# Patient Record
Sex: FEMALE | Race: BLACK OR AFRICAN AMERICAN | Employment: FULL TIME | URBAN - METROPOLITAN AREA
[De-identification: names, ages, dates, MRNs, and addresses within clinical notes are randomized per-mention and may not be internally consistent; named-entity substitution may affect disease eponyms.]

---

## 2017-04-07 ENCOUNTER — OFFICE VISIT (OUTPATIENT)
Dept: FAMILY MEDICINE | Facility: CLINIC | Age: 62
End: 2017-04-07
Payer: COMMERCIAL

## 2017-04-07 VITALS
BODY MASS INDEX: 31.87 KG/M2 | RESPIRATION RATE: 12 BRPM | WEIGHT: 191.3 LBS | HEIGHT: 65 IN | DIASTOLIC BLOOD PRESSURE: 80 MMHG | TEMPERATURE: 98.2 F | HEART RATE: 78 BPM | SYSTOLIC BLOOD PRESSURE: 142 MMHG | OXYGEN SATURATION: 98 %

## 2017-04-07 DIAGNOSIS — R03.0 ELEVATED BLOOD PRESSURE READING WITHOUT DIAGNOSIS OF HYPERTENSION: ICD-10-CM

## 2017-04-07 DIAGNOSIS — S46.912D LEFT SHOULDER STRAIN, SUBSEQUENT ENCOUNTER: ICD-10-CM

## 2017-04-07 DIAGNOSIS — S46.811D STRAIN OF TRAPEZIUS MUSCLE, RIGHT, SUBSEQUENT ENCOUNTER: ICD-10-CM

## 2017-04-07 DIAGNOSIS — S16.1XXD CERVICAL STRAIN, SUBSEQUENT ENCOUNTER: Primary | ICD-10-CM

## 2017-04-07 PROCEDURE — 99213 OFFICE O/P EST LOW 20 MIN: CPT | Performed by: PHYSICIAN ASSISTANT

## 2017-04-07 RX ORDER — CYCLOBENZAPRINE HCL 10 MG
TABLET ORAL
COMMUNITY
Start: 2017-04-04 | End: 2017-05-18

## 2017-04-07 RX ORDER — METHOCARBAMOL 500 MG/1
1000 TABLET, FILM COATED ORAL 3 TIMES DAILY PRN
Qty: 90 TABLET | Refills: 0 | Status: SHIPPED | OUTPATIENT
Start: 2017-04-07 | End: 2017-05-18

## 2017-04-07 RX ORDER — HYDROCODONE BITARTRATE AND ACETAMINOPHEN 5; 325 MG/1; MG/1
TABLET ORAL
COMMUNITY
Start: 2017-04-04 | End: 2017-05-18

## 2017-04-07 NOTE — LETTER
[address]  [address]  [phone]    April 7, 2017      Niteshgarrett KAUR Alexandra New  [address] [facility] [address]              To Whom It MAy Concern,    Please excuse Jose from work until Monday 4/10/17.  She may return to work without restrictions.            Sincerely,    Tia Ledesma PA-C
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
## 2017-04-07 NOTE — PATIENT INSTRUCTIONS
Schedule physical therapy with [facility] ([phone]).  They have several locations around the [facility].    Try 2 tylenol up to every 4 hours as needed for pain.  Follow up with me in if no improvement in sxs   Return urgently if any change in symptoms like increasing pain, numbness, tingling or other change in symptoms.

## 2017-04-13 ENCOUNTER — THERAPY VISIT (OUTPATIENT)
Dept: PHYSICAL THERAPY | Facility: CLINIC | Age: 62
End: 2017-04-13
Payer: COMMERCIAL

## 2017-04-13 DIAGNOSIS — M54.2 NECK PAIN: Primary | ICD-10-CM

## 2017-04-13 PROCEDURE — 97110 THERAPEUTIC EXERCISES: CPT | Mod: GP | Performed by: PHYSICAL THERAPIST

## 2017-04-13 PROCEDURE — 97161 PT EVAL LOW COMPLEX 20 MIN: CPT | Mod: GP | Performed by: PHYSICAL THERAPIST
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
## 2017-04-13 NOTE — MR AVS SNAPSHOT
"              After Visit Summary   4/13/2017    Jose New    MRN: [id number]           Patient Information     Date Of Birth          1955        Visit Information        Provider Department      4/13/2017 5:00 PM Reggie Camargo, PT [facility]        Today's Diagnoses     Neck pain    -  1       Follow-ups after your visit        Your next 10 appointments already scheduled     Apr 19, 2017  4:00 PM CDT   [facility] Spine with Holly Dowling, PT   [facility] ([facility])    [address]  [facility] [id number] [phone]            Apr 24, 2017  6:00 PM CDT   GAEL Spine with Holly Dowling, PT   [facility] ([facility])    [address]  [facility] [id number] [phone]              Who to contact     If you have questions or need follow up information about today's clinic visit or your schedule please contact [facility] PHYSICAL THERAP[facility] directly at [phone].  Normal or non-critical lab and imaging results will be communicated to you by MyChart, letter or phone within 4 business days after the clinic has received the results. If you do not hear from us within 7 days, please contact the clinic through [employer] or phone. If you have a critical or abnormal lab result, we will notify you by phone as soon as possible.  Submit refill requests through [employer] or call your pharmacy and they will forward the refill request to us. Please allow 3 business days for your refill to be completed.          Additional Information About Your Visit        MyChart Information     [employer] lets you send messages to your doctor, view your test results, renew your prescriptions, schedule appointments and more. To sign up, go to [email or web address] . Click on \"Log in\" on the left side of the " "screen, which will take you to the Welcome page. Then click on \"Sign up Now\" on the right side of the page.     You will be asked to enter the access code listed below, as well as some personal information. Please follow the directions to create your username and password.     Your access code is: [email or web address]  Expires: 2017 11:14 AM     Your access code will  in 90 days. If you need help or a new code, please call your [place] clinic or [phone].        Care EveryWhere ID     This is your Care EveryWhere ID. This could be used by other organizations to access your [place] medical records  [id number]         Blood Pressure from Last 3 Encounters:   17 142/80   16 158/88   16 139/75    Weight from Last 3 Encounters:   17 86.8 kg (191 lb 4.8 oz)   16 87 kg (191 lb 14.4 oz)   16 87.6 kg (193 lb 1 oz)              We Performed the Following     HC PT EVAL, LOW COMPLEXITY     [facility] INITIAL EVAL REPORT     THERAPEUTIC EXERCISES        Primary Care Provider Office Phone # Fax #    Tia Ledesma PA-C [phone]       [address]  [facility] [address]        Thank you!     Thank you for [facility]  for your care. Our goal is always to provide you with excellent care. Hearing back from our patients is one way we can continue to improve our services. Please take a few minutes to complete the written survey that you may receive in the mail after your visit with us. Thank you!             Your Updated Medication List - Protect others around you: Learn how to safely use, store and throw away your medicines at www.disposemymeds.org.          This list is accurate as of: 17  6:16 PM.  Always use your most recent med list.                   Brand Name Dispense Instructions for use    cyclobenzaprine 10 MG tablet    FLEXERIL         HYDROcodone-acetaminophen 5-325 " MG per tablet    NORCO         methocarbamol 500 MG tablet    ROBAXIN    90 tablet    Take 2 tablets (1,000 mg) by mouth 3 times daily as needed for muscle spasms

## 2017-04-19 ENCOUNTER — THERAPY VISIT (OUTPATIENT)
Dept: PHYSICAL THERAPY | Facility: CLINIC | Age: 62
End: 2017-04-19
Payer: COMMERCIAL

## 2017-04-19 DIAGNOSIS — M54.2 NECK PAIN: ICD-10-CM

## 2017-04-19 PROCEDURE — 97110 THERAPEUTIC EXERCISES: CPT | Mod: GP | Performed by: PHYSICAL THERAPIST

## 2017-04-19 PROCEDURE — 97140 MANUAL THERAPY 1/> REGIONS: CPT | Mod: GP | Performed by: PHYSICAL THERAPIST

## 2017-04-19 NOTE — MR AVS SNAPSHOT
After Visit Summary   4/19/2017    Jose New    MRN: [id number]           Patient Information     Date Of Birth          1955        Visit Information        Provider Department      4/19/2017 4:00 PM Holly Dowling, PT [facility]        Today's Diagnoses     Neck pain           Follow-ups after your visit        Your next 10 appointments already scheduled     Apr 24, 2017  6:00 PM CDT   GAEL Spine with Holly Dowling PT   [facility] ([facility])    [address]  [facility] [address]   [phone]            May 01, 2017  4:00 PM CDT   GAEL Spine with Holly Dowling PT   [facility] ([facility])    [address]  [facility] [address]   [phone]            May 08, 2017  6:00 PM CDT   GAEL Spine with Holly Dowling PT   [facility] ([facility])    [address]  [facility] [address]   [phone]            May 15, 2017  5:20 PM CDT   GAEL Spine with Holly Dowling PT   [facility] ([facility])    [address]  [facility] [address]   [phone]              Who to contact     If you have questions or need follow up information about today's clinic visit or your schedule please contact [facility] PH[facility] directly at [phone].  Normal or non-critical lab and imaging results will be communicated to you by MyChart, letter or phone within 4 business days after the clinic has received the results. If you do not hear from us within 7 days, please contact the clinic through MyChart or phone. If you have a critical or abnormal lab result, we will notify you by phone as soon as possible.  Submit refill requests  "through Remember [employer] Member or call your pharmacy and they will forward the refill request to us. Please allow 3 business days for your refill to be completed.          Additional Information About Your Visit        Eyepichart Information     Remember The Member lets you send messages to your doctor, view your test results, renew your prescriptions, schedule appointments and more. To sign up, go to [email or web address] . Click on \"Log in\" on the left side of the screen, which will take you to the Welcome page. Then click on \"Sign up Now\" on the right side of the page.     You will be asked to enter the access code listed below, as well as some personal information. Please follow the directions to create your username and password.     Your access code is: [email or web address]  Expires: 2017 11:14 AM     Your access code will  in 90 days. If you need help or a new code, please call your [place] clinic or [phone].        Care EveryWhere ID     This is your Care EveryWhere ID. This could be used by other organizations to access your [place] medical records  [id number]         Blood Pressure from Last 3 Encounters:   17 142/80   16 158/88   16 139/75    Weight from Last 3 Encounters:   17 86.8 kg (191 lb 4.8 oz)   16 87 kg (191 lb 14.4 oz)   16 87.6 kg (193 lb 1 oz)              We Performed the Following     Manual Ther Tech, 1+Regions, EA 15 min     Therapeutic Exercises        Primary Care Provider Office Phone # Fax #    Tia Ledesma PA-C [phone]       [facility] [address] [place] [address] N  [facility] [address]        Thank you!     Thank you for choosing [facility]  for your care. Our goal is always to provide you with excellent care. Hearing back from our patients is one way we can continue to improve our services. Please take a few minutes to complete the written survey that you may receive in the mail " after your visit with us. Thank you!             Your Updated Medication List - Protect others around you: Learn how to safely use, store and throw away your medicines at www.disposemymeds.org.          This list is accurate as of: 4/19/17  5:05 PM.  Always use your most recent med list.                   Brand Name Dispense Instructions for use    cyclobenzaprine 10 MG tablet    FLEXERIL         HYDROcodone-acetaminophen 5-325 MG per tablet    NORCO         methocarbamol 500 MG tablet    ROBAXIN    90 tablet    Take 2 tablets (1,000 mg) by mouth 3 times daily as needed for muscle spasms

## 2017-04-19 NOTE — PROGRESS NOTES
Subjective:    HPI                    Objective:    System    Physical Exam    General     ROS    Assessment/Plan:      SUBJECTIVE  Subjective changes as noted by pt:  My neck, shoulders, upper and lower back pain is very bad- constant pain today and worse as day goes on at work. I have HA every day that comes and goes. I'm trying to do the exercises.   Current pain level: 10/10     Changes in function:  None     Adverse reaction to treatment or activity:  None    OBJECTIVE  Changes in objective findings:  None. Reviewed posture, cervical ROM exercises and scapular retraction with addition of cervical retraction today. Minimal ROM with cervical retraction, pain in neck /thoracic region during and after exercise- however no worse. Patient unable to move into supine position secondary to neck/ thoracic/LBP. Added STM to B UT's/cervical paraspinals in sitting- PL remained at 10/10 following exercises/MT.        ASSESSMENT  Daisie continues to require intervention to meet STG and LTG's: PT  Patient is becoming more independent in home exercise program    Progress made towards STG/LTG?  None    PLAN  Current treatment program is being advanced to more complex exercises.  The following procedures have been added:  posture, manual therapy and cervical retraction    PTA/ATC plan:  N/A    Please refer to the daily flowsheet for treatment today, total treatment time and time spent performing 1:1 timed codes.

## 2017-04-24 ENCOUNTER — THERAPY VISIT (OUTPATIENT)
Dept: PHYSICAL THERAPY | Facility: CLINIC | Age: 62
End: 2017-04-24
Payer: COMMERCIAL

## 2017-04-24 DIAGNOSIS — M54.2 NECK PAIN: ICD-10-CM

## 2017-04-24 PROCEDURE — 97140 MANUAL THERAPY 1/> REGIONS: CPT | Mod: GP | Performed by: PHYSICAL THERAPIST

## 2017-04-24 PROCEDURE — 97110 THERAPEUTIC EXERCISES: CPT | Mod: GP | Performed by: PHYSICAL THERAPIST

## 2017-04-24 NOTE — PROGRESS NOTES
Subjective:    HPI                    Objective:    System    Physical Exam    General     ROS    Assessment/Plan:      SUBJECTIVE  Subjective changes as noted by pt:  My neck,shld's and back pain is slightly better today. I'm doing the exercises and using ice every day. I usually get a HA about 2:00 on a work day- less on weekend.    Current pain level:  Current Pain level: 8/10   Changes in function:  None     Adverse reaction to treatment or activity:  None    OBJECTIVE  Changes in objective findings:  Mild increase in cervical retraction mobility with verbal/manual cues- increased neck pain during movement, no worse following slight increase ROM, verbal/manual cues needed to perform scapular retraction/depression in sitting, unable to transition from SL position to supine secondary to c/o increased neck/back pain. STM performed in sitting to UT's/paraspinals/rhomboids- PL 8/10 following treatment.       ASSESSMENT  Daisie continues to require intervention to meet STG and LTG's: PT  Patient is becoming more independent in home exercise program  Response to therapy has shown an improvement in  pain level  Progress made towards STG/LTG?  None    PLAN  Current treatment program is being advanced to more complex exercises.  The following procedures have been added:  AROM    PTA/ATC plan:  N/A    Please refer to the daily flowsheet for treatment today, total treatment time and time spent performing 1:1 timed codes.

## 2017-04-24 NOTE — MR AVS SNAPSHOT
After Visit Summary   4/24/2017    Jose New    MRN: [id number]           Patient Information     Date Of Birth          1955        Visit Information        Provider Department      4/24/2017 6:00 PM Holly Dowling, PT [facility]        Today's Diagnoses     Neck pain           Follow-ups after your visit        Your next 10 appointments already scheduled     May 01, 2017  4:00 PM CDT   GAEL Spine with Holly Dowling PT   [facility] ([facility])    [address]  [facility] [address]   [phone]            May 08, 2017  6:00 PM CDT   GAEL Spine with Holly Dowling PT   [facility] ([facility])    [address]  [facility] [address]   [phone]            May 15, 2017  5:20 PM CDT   GAEL Spine with Holly Dowling PT   [facility] ([facility])    [address]  [facility] [address]   [phone]              Who to contact     If you have questions or need follow up information about today's clinic visit or your schedule please contact [facility] PHYSICAL THERAPY directly at [phone].  Normal or non-critical lab and imaging results will be communicated to you by MyChart, letter or phone within 4 business days after the clinic has received the results. If you do not hear from us within 7 days, please contact the clinic through [employer] or phone. If you have a critical or abnormal lab result, we will notify you by phone as soon as possible.  Submit refill requests through [employer] or call your pharmacy and they will forward the refill request to us. Please allow 3 business days for your refill to be completed.          Additional Information About Your Visit        MyChart Information      "RESPACE lets you send messages to your doctor, view your test results, renew your prescriptions, schedule appointments and more. To sign up, go to [email or web address] . Click on \"Log in\" on the left side of the screen, which will take you to the Welcome page. Then click on \"Sign up Now\" on the right side of the page.     You will be asked to enter the access code listed below, as well as some personal information. Please follow the directions to create your username and password.     Your access code is: [email or web address]  Expires: 2017 11:14 AM     Your access code will  in 90 days. If you need help or a new code, please call your [place] clinic or [phone].        Care EveryWhere ID     This is your Care EveryWhere ID. This could be used by other organizations to access your [place] medical records  [id number]         Blood Pressure from Last 3 Encounters:   17 142/80   16 158/88   16 139/75    Weight from Last 3 Encounters:   17 86.8 kg (191 lb 4.8 oz)   16 87 kg (191 lb 14.4 oz)   16 87.6 kg (193 lb 1 oz)              We Performed the Following     Manual Ther Tech, 1+Regions, EA 15 min     Therapeutic Exercises        Primary Care Provider Office Phone # Fax #    Tia Ledesma PA-C [phone]       [address] [address]        Thank you!     Thank you for [facility] THER[facility]  for your care. Our goal is always to provide you with excellent care. Hearing back from our patients is one way we can continue to improve our services. Please take a few minutes to complete the written survey that you may receive in the mail after your visit with us. Thank you!             Your Updated Medication List - Protect others around you: Learn how to safely use, store and throw away your medicines at www.disposemymeds.org.          This list is accurate as of: " 4/24/17  6:52 PM.  Always use your most recent med list.                   Brand Name Dispense Instructions for use    cyclobenzaprine 10 MG tablet    FLEXERIL         HYDROcodone-acetaminophen 5-325 MG per tablet    NORCO         methocarbamol 500 MG tablet    ROBAXIN    90 tablet    Take 2 tablets (1,000 mg) by mouth 3 times daily as needed for muscle spasms

## 2017-05-01 ENCOUNTER — THERAPY VISIT (OUTPATIENT)
Dept: PHYSICAL THERAPY | Facility: CLINIC | Age: 62
End: 2017-05-01
Payer: COMMERCIAL

## 2017-05-01 DIAGNOSIS — M54.2 NECK PAIN: ICD-10-CM

## 2017-05-01 PROCEDURE — 97110 THERAPEUTIC EXERCISES: CPT | Mod: GP | Performed by: PHYSICAL THERAPIST

## 2017-05-01 PROCEDURE — 97140 MANUAL THERAPY 1/> REGIONS: CPT | Mod: GP | Performed by: PHYSICAL THERAPIST

## 2017-05-01 NOTE — PROGRESS NOTES
Subjective:    HPI                    Objective:    System    Physical Exam    General     ROS    Assessment/Plan:      SUBJECTIVE  Subjective changes as noted by pt:  I did not get a HA today and I have slightly less intense pain in the neck. My knees and back are still really sore and I maybe I should call my MD about it.      Current pain level:  Current Pain level: 7/10   Changes in function:  Yes, see flow sheet     Adverse reaction to treatment or activity:  None    OBJECTIVE  Changes in objective findings:  difficulty elevating B UE's- scaption 90 degrees + shld's/ neck in sitting or standing -added corner stretch FX or wall climb. Reviewed HEP- verbal/manual cues needed for proper form with cervical retraction and scapular retraction.         ASSESSMENT  Daisie continues to require intervention to meet STG and LTG's: PT  Patient is becoming more independent in home exercise program  Patient is not progressing as expected.  Response to therapy has shown an improvement in  pain level  Response to therapy has shown lack of progress in  ROM  and function  Progress made towards STG/LTG?  Yes (See Goal flowsheet attached for updates on achievement of STG and LTG)    PLAN  Current treatment program is being advanced to more complex exercises.  The following procedures have been added:  AROM for shld's    PTA/ATC plan:  N/A    Please refer to the daily flowsheet for treatment today, total treatment time and time spent performing 1:1 timed codes.

## 2017-05-01 NOTE — MR AVS SNAPSHOT
"              After Visit Summary   5/1/2017    Jose New    MRN: [id number]           Patient Information     Date Of Birth          1955        Visit Information        Provider Department      5/1/2017 4:00 PM Holly Dowling PT [facility]        Today's Diagnoses     Neck pain           Follow-ups after your visit        Your next 10 appointments already scheduled     May 08, 2017  6:00 PM CDT   GAEL Spine with Holly Dowling PT   [facility] ([facility])    [address]  [facility] [address] [phone]            May 15, 2017  5:20 PM CDT   GAEL Spine with Holly Dowling PT   [facility] ([facility])    [address]  [facility] [address] [phone]              Who to contact     If you have questions or need follow up information about today's clinic visit or your schedule please contact [facility] PHYSICAL THERAPY directly at [phone].  Normal or non-critical lab and imaging results will be communicated to you by MyChart, letter or phone within 4 business days after the clinic has received the results. If you do not hear from us within 7 days, please contact the clinic through Aeg[employer]hart or phone. If you have a critical or abnormal lab result, we will notify you by phone as soon as possible.  Submit refill requests through [employer] or call your pharmacy and they will forward the refill request to us. Please allow 3 business days for your refill to be completed.          Additional Information About Your Visit        MyChart Information     [employer]ex[employer] Systems lets you send messages to your doctor, view your test results, renew your prescriptions, schedule appointments and more. To sign up, go to [email or web address] . Click on \"Log in\" on the left side of the screen, " "which will take you to the Welcome page. Then click on \"Sign up Now\" on the right side of the page.     You will be asked to enter the access code listed below, as well as some personal information. Please follow the directions to create your username and password.     Your access code is: [email or web address]  Expires: 2017 11:14 AM     Your access code will  in 90 days. If you need help or a new code, please call your [facility] or [phone].        Care EveryWhere ID     This is your Care EveryWhere ID. This could be used by other organizations to access your [place] medical records  [id number]         Blood Pressure from Last 3 Encounters:   17 142/80   16 158/88   16 139/75    Weight from Last 3 Encounters:   17 86.8 kg (191 lb 4.8 oz)   16 87 kg (191 lb 14.4 oz)   16 87.6 kg (193 lb 1 oz)              We Performed the Following     Manual Ther Tech, 1+Regions, EA 15 min     Therapeutic Exercises        Primary Care Provider Office Phone # Fax #    Tia Ledesma PA-C [phone]       [address]  [facility] [address]        Thank you!     Thank you for choosing [facility] PH[facility] THERAP[facility]  for your care. Our goal is always to provide you with excellent care. Hearing back from our patients is one way we can continue to improve our services. Please take a few minutes to complete the written survey that you may receive in the mail after your visit with us. Thank you!             Your Updated Medication List - Protect others around you: Learn how to safely use, store and throw away your medicines at www.disposemymeds.org.          This list is accurate as of: 17  5:34 PM.  Always use your most recent med list.                   Brand Name Dispense Instructions for use    cyclobenzaprine 10 MG tablet    FLEXERIL         HYDROcodone-acetaminophen 5-325 MG per tablet    NORCO    "      methocarbamol 500 MG tablet    ROBAXIN    90 tablet    Take 2 tablets (1,000 mg) by mouth 3 times daily as needed for muscle spasms

## 2017-05-08 ENCOUNTER — THERAPY VISIT (OUTPATIENT)
Dept: PHYSICAL THERAPY | Facility: CLINIC | Age: 62
End: 2017-05-08
Payer: COMMERCIAL

## 2017-05-08 DIAGNOSIS — M54.2 NECK PAIN: ICD-10-CM

## 2017-05-08 PROCEDURE — 97112 NEUROMUSCULAR REEDUCATION: CPT | Mod: GP | Performed by: PHYSICAL THERAPIST

## 2017-05-08 PROCEDURE — 97140 MANUAL THERAPY 1/> REGIONS: CPT | Mod: GP | Performed by: PHYSICAL THERAPIST

## 2017-05-08 PROCEDURE — 97110 THERAPEUTIC EXERCISES: CPT | Mod: GP | Performed by: PHYSICAL THERAPIST

## 2017-05-08 NOTE — MR AVS SNAPSHOT
"              After Visit Summary   5/8/2017    Jose New    MRN: [id number]           Patient Information     Date Of Birth          1955        Visit Information        Provider Department      5/8/2017 6:00 PM Holly Dowling PT [facility]        Today's Diagnoses     Neck pain           Follow-ups after your visit        Your next 10 appointments already scheduled     May 15, 2017  5:20 PM CDT   GAEL Spine with Holly Dowling PT   [facility] ([facility])    [address]  [facility] [address] [phone]            May 22, 2017  6:00 PM CDT   GAEL Spine with Holly Dowling PT   [facility] ([facility])    [address]  [facility] [address] [phone]              Who to contact     If you have questions or need follow up information about today's clinic visit or your schedule please contact [facility] PHYSICAL THERAPY directly at [phone].  Normal or non-critical lab and imaging results will be communicated to you by MyChart, letter or phone within 4 business days after the clinic has received the results. If you do not hear from us within 7 days, please contact the clinic through Ium[employer] or phone. If you have a critical or abnormal lab result, we will notify you by phone as soon as possible.  Submit refill requests through [employer] or call your pharmacy and they will forward the refill request to us. Please allow 3 business days for your refill to be completed.          Additional Information About Your Visit        MyChart Information     [employer] Search lets you send messages to your doctor, view your test results, renew your prescriptions, schedule appointments and more. To sign up, go to [email or web address] . Click on \"Log in\" on the left side of the screen, " "which will take you to the Welcome page. Then click on \"Sign up Now\" on the right side of the page.     You will be asked to enter the access code listed below, as well as some personal information. Please follow the directions to create your username and password.     Your access code is: [email or web address]  Expires: 2017 11:14 AM     Your access code will  in 90 days. If you need help or a new code, please call your [facility] or [phone].        Care EveryWhere ID     This is your Care EveryWhere ID. This could be used by other organizations to access your [place] medical records  [id number]         Blood Pressure from Last 3 Encounters:   17 142/80   16 158/88   16 139/75    Weight from Last 3 Encounters:   17 86.8 kg (191 lb 4.8 oz)   16 87 kg (191 lb 14.4 oz)   16 87.6 kg (193 lb 1 oz)              We Performed the Following     Manual Ther Tech, 1+Regions, EA 15 min     Neuromuscular Re-Education     Therapeutic Exercises        Primary Care Provider Office Phone # Fax #    Tia Ledesma PA-C [phone]       [address]  [facility] [address]        Thank you!     Thank you for [facility] PHYSICAL THERAP[facility]  for your care. Our goal is always to provide you with excellent care. Hearing back from our patients is one way we can continue to improve our services. Please take a few minutes to complete the written survey that you may receive in the mail after your visit with us. Thank you!             Your Updated Medication List - Protect others around you: Learn how to safely use, store and throw away your medicines at www.disposemymeds.org.          This list is accurate as of: 17  7:23 PM.  Always use your most recent med list.                   Brand Name Dispense Instructions for use    cyclobenzaprine 10 MG tablet    FLEXERIL         HYDROcodone-acetaminophen " 5-325 MG per tablet    NORCO         methocarbamol 500 MG tablet    ROBAXIN    90 tablet    Take 2 tablets (1,000 mg) by mouth 3 times daily as needed for muscle spasms

## 2017-05-08 NOTE — PROGRESS NOTES
Subjective:    HPI                    Objective:    System    Physical Exam    General     ROS    Assessment/Plan:      SUBJECTIVE  Subjective changes as noted by pt:  My neck pain gets worse as day goes on, very sore driving home from work today. I got a HA about mid day today- on weekends I get less HA's. I'm trying to do the exercises several times a day and I think I'm getting better slowly. I think I'm reaching farther up the wall with my arms.      Current pain level: 7/10     Changes in function:  Yes (See Goal flowsheet attached for changes in current functional level)     Adverse reaction to treatment or activity:  None    OBJECTIVE  Changes in objective findings:  No change with shld AROM FLX/scaption- 90 degrees B with pain in shld's/neck. Slight increase in CROM rotation B and retraction mobility.  Patient unable to place towel around lower neck region for cervical EXT exercise due to B shld/neck pain. Reviewed wall climb and encouraged to use B UE's as able at home/work with reaching and light lifting.  Patient attempted to transition sit to supine and was again unable to secondary to neck/shld pain. Patient sits in forward head posture- discussed and reviewed the importance of frequent cervical/scapular retraction exercises to decrease the strain on neck/upper back. Trial of rowing with TB- verbal/manual cuing needed however minimal scapular/ UE movement observed.      ASSESSMENT  Daisie continues to require intervention to meet STG and LTG's: PT  Patient is becoming more independent in home exercise program  Patient is not progressing as expected.  Minimal change of sx's noted.  Response to therapy has shown an improvement in  pain level and ROM neck   Response to therapy has shown lack of progress in  muscle control, posture and function  Progress made towards STG/LTG?  Yes (See Goal flowsheet attached for updates on achievement of STG and LTG)    PLAN  Continue current treatment plan until patient  demonstrates readiness to progress to higher level exercises.  The following procedures have been added:  AROM, neuromuscular re-education and posture    PTA/ATC plan:  N/A    Please refer to the daily flowsheet for treatment today, total treatment time and time spent performing 1:1 timed codes.

## 2017-05-15 ENCOUNTER — THERAPY VISIT (OUTPATIENT)
Dept: PHYSICAL THERAPY | Facility: CLINIC | Age: 62
End: 2017-05-15
Payer: COMMERCIAL

## 2017-05-15 DIAGNOSIS — M54.2 NECK PAIN: ICD-10-CM

## 2017-05-15 PROCEDURE — 97140 MANUAL THERAPY 1/> REGIONS: CPT | Mod: GP | Performed by: PHYSICAL THERAPIST

## 2017-05-15 PROCEDURE — 97110 THERAPEUTIC EXERCISES: CPT | Mod: GP | Performed by: PHYSICAL THERAPIST

## 2017-05-15 PROCEDURE — 97112 NEUROMUSCULAR REEDUCATION: CPT | Mod: GP | Performed by: PHYSICAL THERAPIST

## 2017-05-15 NOTE — MR AVS SNAPSHOT
After Visit Summary   5/15/2017    Jose New    MRN: [id number]           Patient Information     Date Of Birth          1955        Visit Information        Provider Department      5/15/2017 5:20 PM Holly Dowling PT [facility]        Today's Diagnoses     Neck pain           Follow-ups after your visit        Your next 10 appointments already scheduled     May 18, 2017  4:40 PM CDT   Office Visit with Tia Ledesma PA-C   [facility]    [address] [place] [id number] [phone]           Bring a current list of meds and any records pertaining to this visit.  For Physicals, please bring immunization records and any forms needing to be filled out.  Please arrive 10 minutes early to complete paperwork.            May 22, 2017  6:00 PM CDT   GAEL Spine with Holly Dowling PT   [facility] ([facility])    [address]  [facility] [id number] [phone]              Who to contact     If you have questions or need follow up information about today's clinic visit or your schedule please contact [facility] directly at [phone].  Normal or non-critical lab and imaging results will be communicated to you by MyChart, letter or phone within 4 business days after the clinic has received the results. If you do not hear from us within 7 days, please contact the clinic through MyChart or phone. If you have a critical or abnormal lab result, we will notify you by phone as soon as possible.  Submit refill requests through [employer] or call your pharmacy and they will forward the refill request to us. Please allow 3 business days for your refill to be completed.          Additional Information About Your Visit        MyChart Information     [employer]  "lets you send messages to your doctor, view your test results, renew your prescriptions, schedule appointments and more. To sign up, go to [email or web address] . Click on \"Log in\" on the left side of the screen, which will take you to the Welcome page. Then click on \"Sign up Now\" on the right side of the page.     You will be asked to enter the access code listed below, as well as some personal information. Please follow the directions to create your username and password.     Your access code is: [email or web address]  Expires: 2017 11:14 AM     Your access code will  in 90 days. If you need help or a new code, please call your [facility] clinic or [phone].        Care EveryWhere ID     This is your Care EveryWhere ID. This could be used by other organizations to access your [facility] medical records  [id number]         Blood Pressure from Last 3 Encounters:   17 142/80   16 158/88   16 139/75    Weight from Last 3 Encounters:   17 86.8 kg (191 lb 4.8 oz)   16 87 kg (191 lb 14.4 oz)   16 87.6 kg (193 lb 1 oz)              We Performed the Following     GAEL Progress Notes Report     Manual Ther Tech, 1+Regions, EA 15 min     Neuromuscular Re-Education     Therapeutic Exercises        Primary Care Provider Office Phone # Fax #    Tia Ledesma PA-C [phone]       [address] [address]        Thank you!     Thank you for choosing [facility]  for your care. Our goal is always to provide you with excellent care. Hearing back from our patients is one way we can continue to improve our services. Please take a few minutes to complete the written survey that you may receive in the mail after your visit with us. Thank you!             Your Updated Medication List - Protect others around you: Learn how to safely use, store and throw away your medicines at " www.disposemymeds.org.          This list is accurate as of: 5/15/17  6:45 PM.  Always use your most recent med list.                   Brand Name Dispense Instructions for use    cyclobenzaprine 10 MG tablet    FLEXERIL         HYDROcodone-acetaminophen 5-325 MG per tablet    NORCO         methocarbamol 500 MG tablet    ROBAXIN    90 tablet    Take 2 tablets (1,000 mg) by mouth 3 times daily as needed for muscle spasms

## 2017-05-15 NOTE — PROGRESS NOTES
Subjective:    HPI                    Objective:    System    Physical Exam    General     ROS    Assessment/Plan:      PROGRESS  REPORT    Progress reporting period is from 4/13/17  to 5/15/17.       SUBJECTIVE  Subjective changes noted by patient:  I think that I'm improving slowly with regard to my neck motion and ability to raise my arms with reaching. I have less HA's overall but still get them about 3x a week. I still can not lay in bed without propping several pillows under my head.      Current pain level is  Current Pain level: 6.5/10     Previous pain level was  7/10  .   Changes in function:  Yes (See Goal flowsheet attached for changes in current functional level)  Adverse reaction to treatment or activity: None    OBJECTIVE  Changes noted in objective findings:  CROM: B rotation 40% pain neck , SB 40% B pain in neck, FLX 50% pain neck, EXT 30% pain in neck.  Forward head posture/rounded shoulders in sitting- verbal/manual cues needed to improve posture with discussion of the importance decreasing strain on neck/thoracic region.   B shld AROM: FLX 80 degrees with c/o shld and neck pain.  Patient unable to transition from sit to supine secondary to neck/shoulder pain.  Increased muscle tension/point tenderness to light touch B UT's, cervical paraspinals/SHREE's.     ASSESSMENT/PLAN  Updated problem list and treatment plan: Diagnosis 1:  Cervical thoracic strain  Pain -  hot/cold therapy, manual therapy, self management, education, directional preference exercise and home program  Decreased ROM/flexibility - manual therapy, therapeutic exercise and home program  Decreased strength - therapeutic exercise, therapeutic activities and home program  Inflammation - cold therapy and self management/home program  Impaired muscle performance - neuro re-education and home program  Decreased function - therapeutic activities and home program  Impaired posture - neuro re-education and home program  STG/LTGs have been  met or progress has been made towards goals:  Yes (See Goal flow sheet completed today.)  Assessment of Progress: The patient has had set backs in their progress.  Patient is meeting short term goals and is progressing towards long term goals.  Patient progress is slower than expected at this point in recovery.  Self Management Plans:  Patient has been instructed in a home treatment program.  Patient  has been instructed in self management of symptoms.  I have re-evaluated this patient and find that the nature, scope, duration and intensity of the therapy is appropriate for the medical condition of the patient.  Jose continues to require the following intervention to meet STG and LTG's:  PT    Recommendations:  This patient would benefit from further evaluation.    Please refer to the daily flowsheet for treatment today, total treatment time and time spent performing 1:1 timed codes.

## 2017-05-18 ENCOUNTER — OFFICE VISIT (OUTPATIENT)
Dept: FAMILY MEDICINE | Facility: CLINIC | Age: 62
End: 2017-05-18
Payer: COMMERCIAL

## 2017-05-18 ENCOUNTER — TELEPHONE (OUTPATIENT)
Dept: FAMILY MEDICINE | Facility: CLINIC | Age: 62
End: 2017-05-18

## 2017-05-18 VITALS
HEART RATE: 97 BPM | OXYGEN SATURATION: 100 % | DIASTOLIC BLOOD PRESSURE: 80 MMHG | TEMPERATURE: 98.1 F | SYSTOLIC BLOOD PRESSURE: 160 MMHG

## 2017-05-18 DIAGNOSIS — M25.561 ACUTE PAIN OF BOTH KNEES: Primary | ICD-10-CM

## 2017-05-18 DIAGNOSIS — S46.912D LEFT SHOULDER STRAIN, SUBSEQUENT ENCOUNTER: ICD-10-CM

## 2017-05-18 DIAGNOSIS — S16.1XXD CERVICAL STRAIN, SUBSEQUENT ENCOUNTER: ICD-10-CM

## 2017-05-18 DIAGNOSIS — S33.5XXD SPRAIN OF LUMBAR REGION, SUBSEQUENT ENCOUNTER: ICD-10-CM

## 2017-05-18 DIAGNOSIS — S46.811D STRAIN OF TRAPEZIUS MUSCLE, RIGHT, SUBSEQUENT ENCOUNTER: ICD-10-CM

## 2017-05-18 DIAGNOSIS — M25.562 ACUTE PAIN OF BOTH KNEES: Primary | ICD-10-CM

## 2017-05-18 PROCEDURE — 99213 OFFICE O/P EST LOW 20 MIN: CPT | Performed by: PHYSICIAN ASSISTANT

## 2017-05-18 RX ORDER — CYCLOBENZAPRINE HCL 10 MG
TABLET ORAL
Qty: 42 TABLET | Status: CANCELLED | OUTPATIENT
Start: 2017-05-18

## 2017-05-18 RX ORDER — METHOCARBAMOL 500 MG/1
1000 TABLET, FILM COATED ORAL 3 TIMES DAILY PRN
Qty: 120 TABLET | Refills: 0 | Status: SHIPPED | OUTPATIENT
Start: 2017-05-18 | End: 2017-07-12
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
## 2017-05-18 NOTE — PATIENT INSTRUCTIONS
Follow up with us for hypertension   Continue methocarbamol as needed for pain  Continue physical therapy  Follow up with us if no improvement over the next 2-3 weeks with physical therapy  Return urgently if any change in symptoms.
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
## 2017-05-22 ENCOUNTER — THERAPY VISIT (OUTPATIENT)
Dept: PHYSICAL THERAPY | Facility: CLINIC | Age: 62
End: 2017-05-22
Payer: COMMERCIAL

## 2017-05-22 DIAGNOSIS — M54.2 NECK PAIN: ICD-10-CM

## 2017-05-22 PROCEDURE — 97110 THERAPEUTIC EXERCISES: CPT | Mod: GP | Performed by: PHYSICAL THERAPIST

## 2017-05-22 PROCEDURE — 97140 MANUAL THERAPY 1/> REGIONS: CPT | Mod: GP | Performed by: PHYSICAL THERAPIST

## 2017-05-22 PROCEDURE — 97112 NEUROMUSCULAR REEDUCATION: CPT | Mod: GP | Performed by: PHYSICAL THERAPIST

## 2017-05-22 NOTE — PROGRESS NOTES
Subjective:    HPI                    Objective:    System    Physical Exam    General     ROS    Assessment/Plan:      SUBJECTIVE  Subjective changes as noted by pt:  I went in to my MD and she gave me PT order for my back and knees. My neck is about the same but I think I'm getting better overall. I did not have HA today.   Current pain level:  Current Pain level: 7/10   Changes in function:  Yes (See Goal flowsheet attached for changes in current functional level)     Adverse reaction to treatment or activity:  None    OBJECTIVE  Changes in objective findings:  Minimal change in active CROM all motions compared to NE on 5/15/17. Added pt OP on chin with cervical rotation in sitting and PT OP( observed increased rotation motion with PROM B) posture remains same- forward head/rounded shlds- constant verbal/manual cues needed to improve neutral sitting posture. B shld FLX AAROM with wand : 90 degrees. Less sensitivity to light tough B UT'/s paraspinals.         ASSESSMENT  Daisie continues to require intervention to meet STG and LTG's: PT  Patient is becoming more independent in home exercise program  Patient is not progressing as expected.  Response to therapy has shown an improvement in  pain level, HA frequency and muscle control  Response to therapy has shown lack of progress in  ROM , posture and function  Progress made towards STG/LTG?  Yes (See Goal flowsheet attached for updates on achievement of STG and LTG)    PLAN  Current treatment program is being advanced to more complex exercises.  The following procedures have been added:  PROM/AAROM, AROM and strengthening    PTA/ATC plan:  N/A    Please refer to the daily flowsheet for treatment today, total treatment time and time spent performing 1:1 timed codes.

## 2017-05-22 NOTE — MR AVS SNAPSHOT
"              After Visit Summary   5/22/2017    Jose New    MRN: [id number]           Patient Information     Date Of Birth          1955        Visit Information        Provider Department      5/22/2017 6:00 PM Holly Dowling PT [facility]        Today's Diagnoses     Neck pain           Follow-ups after your visit        Your next 10 appointments already scheduled     Jun 02, 2017  3:20 PM CDT   GAEL Spine with Holly Dowling PT   [facility] ([facility])    [address]  [facility] [id number] [phone]            Jun 05, 2017  3:20 PM CDT   GAEL Spine with Holly Dowling PT   [facility] ([facility])    [address]  [facility] [id number] [phone]              Who to contact     If you have questions or need follow up information about today's clinic visit or your schedule please contact [facility] directly at [phone].  Normal or non-critical lab and imaging results will be communicated to you by MyChart, letter or phone within 4 business days after the clinic has received the results. If you do not hear from us within 7 days, please contact the clinic through PrePayhart or phone. If you have a critical or abnormal lab result, we will notify you by phone as soon as possible.  Submit refill requests through Artify It or call your pharmacy and they will forward the refill request to us. Please allow 3 business days for your refill to be completed.          Additional Information About Your Visit        MyChart Information     Artify It lets you send messages to your doctor, view your test results, renew your prescriptions, schedule appointments and more. To sign up, go to [email or web address] . Click on \"Log in\" on the left side of the screen, " "which will take you to the Welcome page. Then click on \"Sign up Now\" on the right side of the page.     You will be asked to enter the access code listed below, as well as some personal information. Please follow the directions to create your username and password.     Your access code is: [email or web address]  Expires: 2017 11:14 AM     Your access code will  in 90 days. If you need help or a new code, please call your [facility] or [phone].        Care EveryWhere ID     This is your Care EveryWhere ID. This could be used by other organizations to access your [place] medical records  [id number]         Blood Pressure from Last 3 Encounters:   17 160/80   17 142/80   16 158/88    Weight from Last 3 Encounters:   17 86.8 kg (191 lb 4.8 oz)   16 87 kg (191 lb 14.4 oz)   16 87.6 kg (193 lb 1 oz)              We Performed the Following     Manual Ther Tech, 1+Regions, EA 15 min     Neuromuscular Re-Education     Therapeutic Exercises        Primary Care Provider Office Phone # Fax #    Tia Ledesma PA-C [phone]       [address] [address]        Thank you!     Thank you for [facility]  for your care. Our goal is always to provide you with excellent care. Hearing back from our patients is one way we can continue to improve our services. Please take a few minutes to complete the written survey that you may receive in the mail after your visit with us. Thank you!             Your Updated Medication List - Protect others around you: Learn how to safely use, store and throw away your medicines at www.disposemymeds.org.          This list is accurate as of: 17  7:19 PM.  Always use your most recent med list.                   Brand Name Dispense Instructions for use    methocarbamol 500 MG tablet    ROBAXIN    120 tablet    Take 2 tablets " (1,000 mg) by mouth 3 times daily as needed for muscle spasms

## 2017-06-01 ENCOUNTER — TELEPHONE (OUTPATIENT)
Dept: PEDIATRICS | Facility: CLINIC | Age: 62
End: 2017-06-01

## 2017-06-02 ENCOUNTER — THERAPY VISIT (OUTPATIENT)
Dept: PHYSICAL THERAPY | Facility: CLINIC | Age: 62
End: 2017-06-02
Payer: COMMERCIAL

## 2017-06-02 DIAGNOSIS — M25.562 BILATERAL KNEE PAIN: Primary | ICD-10-CM

## 2017-06-02 DIAGNOSIS — M54.50 BILATERAL LOW BACK PAIN WITHOUT SCIATICA: ICD-10-CM

## 2017-06-02 DIAGNOSIS — M25.561 BILATERAL KNEE PAIN: Primary | ICD-10-CM

## 2017-06-02 PROCEDURE — 97161 PT EVAL LOW COMPLEX 20 MIN: CPT | Mod: GP | Performed by: PHYSICAL THERAPIST

## 2017-06-02 PROCEDURE — 97110 THERAPEUTIC EXERCISES: CPT | Mod: GP | Performed by: PHYSICAL THERAPIST
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
## 2017-06-02 NOTE — MR AVS SNAPSHOT
After Visit Summary   6/2/2017    Jose New    MRN: [id number]           Patient Information     Date Of Birth          1955        Visit Information        Provider Department      6/2/2017 3:20 PM Holly Dowling, PT [facility]        Today's Diagnoses     Bilateral knee pain    -  1    Bilateral low back pain without sciatica           Follow-ups after your visit        Your next 10 appointments already scheduled     Jun 05, 2017  3:20 PM CDT   GAEL Spine with Holly Dowling PT   [facility] ([facility])    [address]  [facility] [address]   [phone]            Jun 14, 2017  3:20 PM CDT   GAEL Spine with Holly Dowling PT   [facility] ([facility])    [address]  [facility] [address]   [phone]            Jun 19, 2017  6:00 PM CDT   GAEL Spine with Holly Dowling PT   [facility] ([facility])    [address]  [facility] [address]   [phone]            Jun 26, 2017  5:20 PM CDT   GAEL Spine with Holly Dowling PT   [facility] ([facility])    [address]  [facility] [address]   [phone]              Who to contact     If you have questions or need follow up information about today's clinic visit or your schedule please contact [facility] directly at [phone].  Normal or non-critical lab and imaging results will be communicated to you by MyChart, letter or phone within 4 business days after the clinic has received the results. If you do not hear from us within 7 days, please contact the clinic through MyChart or phone. If you have a critical or abnormal lab result, we will notify you  "by phone as soon as possible.  Submit refill requests through Frugoton or call your pharmacy and they will forward the refill request to us. Please allow 3 business days for your refill to be completed.          Additional Information About Your Visit        [employer] Information     Frugoton lets you send messages to your doctor, view your test results, renew your prescriptions, schedule appointments and more. To sign up, go to [email or web address] . Click on \"Log in\" on the left side of the screen, which will take you to the Welcome page. Then click on \"Sign up Now\" on the right side of the page.     You will be asked to enter the access code listed below, as well as some personal information. Please follow the directions to create your username and password.     Your access code is: [email or web address]  Expires: 2017 11:14 AM     Your access code will  in 90 days. If you need help or a new code, please call your [place] clinic or [phone].        Care EveryWhere ID     This is your Care EveryWhere ID. This could be used by other organizations to access your [place] medical records  [id number]         Blood Pressure from Last 3 Encounters:   17 160/80   17 142/80   16 158/88    Weight from Last 3 Encounters:   17 86.8 kg (191 lb 4.8 oz)   16 87 kg (191 lb 14.4 oz)   16 87.6 kg (193 lb 1 oz)              We Performed the Following     GAEL Inital Eval Report     PT Eval, Low Complexity ([address])     Therapeutic Exercises        Primary Care Provider Office Phone # Fax #    Tia Ledesma PA-C [phone]       [facility] [address] [place] [address] N  [facility] [address]        Thank you!     Thank you for choosing [facility]ICAL THERAP[facility]  for your care. Our goal is always to provide you with excellent care. Hearing back from our patients is one way we can continue to improve our services. Please take a few " minutes to complete the written survey that you may receive in the mail after your visit with us. Thank you!             Your Updated Medication List - Protect others around you: Learn how to safely use, store and throw away your medicines at www.disposemymeds.org.          This list is accurate as of: 6/2/17  5:16 PM.  Always use your most recent med list.                   Brand Name Dispense Instructions for use    methocarbamol 500 MG tablet    ROBAXIN    120 tablet    Take 2 tablets (1,000 mg) by mouth 3 times daily as needed for muscle spasms
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
## 2017-06-03 ENCOUNTER — HEALTH MAINTENANCE LETTER (OUTPATIENT)
Age: 62
End: 2017-06-03

## 2017-06-05 ENCOUNTER — THERAPY VISIT (OUTPATIENT)
Dept: PHYSICAL THERAPY | Facility: CLINIC | Age: 62
End: 2017-06-05
Payer: COMMERCIAL

## 2017-06-05 DIAGNOSIS — M25.562 BILATERAL KNEE PAIN: ICD-10-CM

## 2017-06-05 DIAGNOSIS — M54.2 NECK PAIN: ICD-10-CM

## 2017-06-05 DIAGNOSIS — M54.50 BILATERAL LOW BACK PAIN WITHOUT SCIATICA: ICD-10-CM

## 2017-06-05 DIAGNOSIS — M25.561 BILATERAL KNEE PAIN: ICD-10-CM

## 2017-06-05 PROCEDURE — 97140 MANUAL THERAPY 1/> REGIONS: CPT | Mod: GP | Performed by: PHYSICAL THERAPIST

## 2017-06-05 PROCEDURE — 97110 THERAPEUTIC EXERCISES: CPT | Mod: GP | Performed by: PHYSICAL THERAPIST

## 2017-06-05 PROCEDURE — 97112 NEUROMUSCULAR REEDUCATION: CPT | Mod: GP | Performed by: PHYSICAL THERAPIST

## 2017-06-05 NOTE — PROGRESS NOTES
Subjective:    HPI                    Objective:    System    Physical Exam    General     ROS    Assessment/Plan:      SUBJECTIVE  Subjective changes as noted by pt:  My knees and back are feeling about the same as last Friday. My neck is still very stiff and I got a HA in the afternoon at work. I'm trying to do the HEP daily.     Current pain level:  Current Pain level: 6/10 (back, neck knees)   Changes in function:  Yes- see goal flow sheet     Adverse reaction to treatment or activity:  None    OBJECTIVE  Changes in objective findings:  Minimal change in shoulder AROM in standing : FLX  R 95 + shld/neck L 90 + shld/neck. Patient slowly transferred to supine position- added PROM cervical rotation( 70% B rotation however 40% AROM rotation in sitting position) Patient reported increased PL to 10/10 in neck/shld's following MT in supine.  Verbal /manual cues needed for proper technique with all exercises- see flow sheet.  Added roll in for adductors/ VMO and TKE B for quad strengthening.           ASSESSMENT  Daisie continues to require intervention to meet STG and LTG's: PT  Patient is becoming more independent in home exercise program  Patient is progressing slower than expected.     Response to therapy has shown  progress in  ROM neck, pain level neck/ HA   Progress made towards STG/LTG?  Yes (See Goal flowsheet attached for updates on achievement of STG and LTG)    PLAN  Current treatment program is being advanced to more complex exercises for knees. No advanced exercise for neck or back- patient is very challenged with current HEP and requires manual/verbal cues for all exercises.  The following procedures have been added:  strengthening, neuromuscular re-education and manual therapy    PTA/ATC plan:  N/A    Please refer to the daily flowsheet for treatment today, total treatment time and time spent performing 1:1 timed codes.

## 2017-06-05 NOTE — MR AVS SNAPSHOT
After Visit Summary   6/5/2017    Jose New    MRN: [id number]           Patient Information     Date Of Birth          1955        Visit Information        Provider Department      6/5/2017 3:20 PM Holly Dowling, PT [facility]        Today's Diagnoses     Bilateral knee pain        Bilateral low back pain without sciatica        Neck pain           Follow-ups after your visit        Your next 10 appointments already scheduled     Jun 14, 2017  3:20 PM CDT   GAEL Spine with Holly Dowling PT   [facility] ([facility])    [address]  [facility] [address]   [phone]            Jun 19, 2017  6:00 PM CDT   GAEL Spine with Holly Dowling PT   [facility] ([facility])    [address]  [facility] [address]   [phone]            Jun 26, 2017  5:20 PM CDT   GAEL Spine with Holly Dowling PT   [facility] ([facility])    [address]  [facility] [address]   [phone]              Who to contact     If you have questions or need follow up information about today's clinic visit or your schedule please contact [facility]ERAP[facility] directly at [phone].  Normal or non-critical lab and imaging results will be communicated to you by MyChart, letter or phone within 4 business days after the clinic has received the results. If you do not hear from us within 7 days, please contact the clinic through MyChart or phone. If you have a critical or abnormal lab result, we will notify you by phone as soon as possible.  Submit refill requests through [employer] or call your pharmacy and they will forward the refill request to us. Please allow 3 business days for your refill to be completed.           "Additional Information About Your Visit        MyChart Information     [employer] lets you send messages to your doctor, view your test results, renew your prescriptions, schedule appointments and more. To sign up, go to [email or web address] . Click on \"Log in\" on the left side of the screen, which will take you to the Welcome page. Then click on \"Sign up Now\" on the right side of the page.     You will be asked to enter the access code listed below, as well as some personal information. Please follow the directions to create your username and password.     Your access code is: [email or web address]  Expires: 2017 11:14 AM     Your access code will  in 90 days. If you need help or a new code, please call your [place] clinic or [phone].        Care EveryWhere ID     This is your Care EveryWhere ID. This could be used by other organizations to access your [place] medical records  [id number]         Blood Pressure from Last 3 Encounters:   17 160/80   17 142/80   16 158/88    Weight from Last 3 Encounters:   17 86.8 kg (191 lb 4.8 oz)   16 87 kg (191 lb 14.4 oz)   16 87.6 kg (193 lb 1 oz)              We Performed the Following     Manual Ther Tech, 1+Regions, EA 15 min     Neuromuscular Re-Education     Therapeutic Exercises        Primary Care Provider Office Phone # Fax #    Tia Ledesma PA-C [phone]       [address] [address]        Thank you!     Thank you for choosing [facility]  for your care. Our goal is always to provide you with excellent care. Hearing back from our patients is one way we can continue to improve our services. Please take a few minutes to complete the written survey that you may receive in the mail after your visit with us. Thank you!             Your Updated Medication List - Protect others around you: Learn how to safely " use, store and throw away your medicines at www.disposemymeds.org.          This list is accurate as of: 6/5/17  6:20 PM.  Always use your most recent med list.                   Brand Name Dispense Instructions for use    methocarbamol 500 MG tablet    ROBAXIN    120 tablet    Take 2 tablets (1,000 mg) by mouth 3 times daily as needed for muscle spasms

## 2017-06-14 ENCOUNTER — THERAPY VISIT (OUTPATIENT)
Dept: PHYSICAL THERAPY | Facility: CLINIC | Age: 62
End: 2017-06-14
Payer: COMMERCIAL

## 2017-06-14 DIAGNOSIS — M54.2 NECK PAIN: ICD-10-CM

## 2017-06-14 DIAGNOSIS — M54.50 BILATERAL LOW BACK PAIN WITHOUT SCIATICA: ICD-10-CM

## 2017-06-14 DIAGNOSIS — M25.561 BILATERAL KNEE PAIN: ICD-10-CM

## 2017-06-14 DIAGNOSIS — M25.562 BILATERAL KNEE PAIN: ICD-10-CM

## 2017-06-14 PROCEDURE — 97112 NEUROMUSCULAR REEDUCATION: CPT | Mod: GP | Performed by: PHYSICAL THERAPIST

## 2017-06-14 PROCEDURE — 97140 MANUAL THERAPY 1/> REGIONS: CPT | Mod: GP | Performed by: PHYSICAL THERAPIST

## 2017-06-14 PROCEDURE — 97530 THERAPEUTIC ACTIVITIES: CPT | Mod: GP | Performed by: PHYSICAL THERAPIST

## 2017-06-14 PROCEDURE — 97110 THERAPEUTIC EXERCISES: CPT | Mod: GP | Performed by: PHYSICAL THERAPIST

## 2017-06-14 NOTE — PROGRESS NOTES
Subjective:    HPI                    Objective:    System    Physical Exam    General     ROS    Assessment/Plan:      SUBJECTIVE  Subjective changes as noted by pt: I think I'm getting better. I'm doing the HEP. My neck was better at work today and I did not have a HA. My knees still hurt a lot with walking and stairs but less sharp pains. My back gets sore after sitting 10 min. at work but I can get up every 10' if I want which helps.        Current pain level:  Current Pain level: 6/10 neck (5/10 knees back)   Changes in function:  Yes (See Goal flowsheet attached for changes in current functional level)     Adverse reaction to treatment or activity:  None    OBJECTIVE  Changes in objective findings: minimal change in Shld AROM standing: FLX  B 95 + shld/neck   CROM: sitting- rotation 50% B + neck (pt guarded/ painful with cervical PROM supine rotation)  Reviewed HEP with verbal /manual cues needed for proper technique with exercises, addition of supported knee bends- tolerated fairly well. Instructed in neutral body mechanics with bending and lifting- verbal/manual cues needed to perform correctly.  Point tenderness/ muscle tension B UT's/ SHREE's- increased PL following MT.        ASSESSMENT  Daisie continues to require intervention to meet STG and LTG's: PT  Patient is becoming more independent in home exercise program  Patient is responding slower than expected.  Response to therapy has shown an improvement in  pain level, ROM neck , posture and HA  Response to therapy has shown lack of progress in  ROM and strength shoulders/UE and minimal change in function  Progress made towards STG/LTG?  Yes (See Goal flowsheet attached for updates on achievement of STG and LTG)    PLAN  Current treatment program is being advanced to more complex exercises.  The following procedures have been added:  strengthening, body mechanics and therapeutic activities    PTA/ATC plan:  N/A    Please refer to the daily flowsheet for  treatment today, total treatment time and time spent performing 1:1 timed codes.

## 2017-06-14 NOTE — MR AVS SNAPSHOT
After Visit Summary   6/14/2017    Jose New    MRN: [id number]           Patient Information     Date Of Birth          1955        Visit Information        Provider Department      6/14/2017 3:20 PM Holly Dowling, PT [facility]        Today's Diagnoses     Bilateral knee pain        Bilateral low back pain without sciatica        Neck pain           Follow-ups after your visit        Your next 10 appointments already scheduled     Jun 22, 2017  4:20 PM CDT   GAEL Spine with Reggie Camargo PT   [facility] ([facility])    [address]  [facility] [address]   [phone]            Jun 26, 2017  5:20 PM CDT   GAEL Spine with Holly Dowling PT   [facility] ([facility])    [address]  [facility] [address]   [phone]            Jul 03, 2017  5:20 PM CDT   GAEL Spine with Holly Dowling PT   [facility] ([facility])    [address]  [facility] [address]   [phone]              Who to contact     If you have questions or need follow up information about today's clinic visit or your schedule please contact [facility] PHYSICAL THERAPY directly at [phone].  Normal or non-critical lab and imaging results will be communicated to you by MyChart, letter or phone within 4 business days after the clinic has received the results. If you do not hear from us within 7 days, please contact the clinic through MyChart or phone. If you have a critical or abnormal lab result, we will notify you by phone as soon as possible.  Submit refill requests through [employer] or call your pharmacy and they will forward the refill request to us. Please allow 3 business days for your refill to be completed.  "         Additional Information About Your Visit        MyChart Information     [employer] lets you send messages to your doctor, view your test results, renew your prescriptions, schedule appointments and more. To sign up, go to [email or web address] . Click on \"Log in\" on the left side of the screen, which will take you to the Welcome page. Then click on \"Sign up Now\" on the right side of the page.     You will be asked to enter the access code listed below, as well as some personal information. Please follow the directions to create your username and password.     Your access code is: [email or web address]  Expires: 2017 11:14 AM     Your access code will  in 90 days. If you need help or a new code, please call your [place] clinic or [phone].        Care EveryWhere ID     This is your Care EveryWhere ID. This could be used by other organizations to access your [place] medical records  [id number]         Blood Pressure from Last 3 Encounters:   17 160/80   17 142/80   16 158/88    Weight from Last 3 Encounters:   17 86.8 kg (191 lb 4.8 oz)   16 87 kg (191 lb 14.4 oz)   16 87.6 kg (193 lb 1 oz)              We Performed the Following     Manual Ther Tech, 1+Regions, EA 15 min     Neuromuscular Re-Education     Therapeutic Activities     Therapeutic Exercises        Primary Care Provider Office Phone # Fax #    Tia Ledesma PA-C [phone]       [facility] [address] [place] [address]        Thank you!     Thank you for choosing [facility]  for your care. Our goal is always to provide you with excellent care. Hearing back from our patients is one way we can continue to improve our services. Please take a few minutes to complete the written survey that you may receive in the mail after your visit with us. Thank you!             Your Updated Medication List - Protect others " around you: Learn how to safely use, store and throw away your medicines at www.disposemymeds.org.          This list is accurate as of: 6/14/17  4:54 PM.  Always use your most recent med list.                   Brand Name Dispense Instructions for use    methocarbamol 500 MG tablet    ROBAXIN    120 tablet    Take 2 tablets (1,000 mg) by mouth 3 times daily as needed for muscle spasms

## 2017-06-22 ENCOUNTER — THERAPY VISIT (OUTPATIENT)
Dept: PHYSICAL THERAPY | Facility: CLINIC | Age: 62
End: 2017-06-22
Payer: COMMERCIAL

## 2017-06-22 DIAGNOSIS — M54.50 ACUTE BILATERAL LOW BACK PAIN WITHOUT SCIATICA: ICD-10-CM

## 2017-06-22 DIAGNOSIS — M54.2 NECK PAIN: ICD-10-CM

## 2017-06-22 DIAGNOSIS — M25.561 ACUTE PAIN OF BOTH KNEES: ICD-10-CM

## 2017-06-22 DIAGNOSIS — M25.562 ACUTE PAIN OF BOTH KNEES: ICD-10-CM

## 2017-06-22 PROCEDURE — 97110 THERAPEUTIC EXERCISES: CPT | Mod: GP | Performed by: PHYSICAL THERAPIST

## 2017-06-22 PROCEDURE — 97112 NEUROMUSCULAR REEDUCATION: CPT | Mod: GP | Performed by: PHYSICAL THERAPIST

## 2017-06-22 PROCEDURE — 97140 MANUAL THERAPY 1/> REGIONS: CPT | Mod: GP | Performed by: PHYSICAL THERAPIST

## 2017-06-22 NOTE — MR AVS SNAPSHOT
After Visit Summary   6/22/2017    Jose New    MRN: [id number]           Patient Information     Date Of Birth          1955        Visit Information        Provider Department      6/22/2017 5:00 PM Reggie Camargo, PT [facility]        Today's Diagnoses     Acute pain of both knees        Acute bilateral low back pain without sciatica        Neck pain           Follow-ups after your visit        Your next 10 appointments already scheduled     Jun 26, 2017  5:20 PM CDT   GAEL Spine with Holly Dowling, PT   [facility] ([facility])    [address]  [facility] [address] [phone]            Jul 03, 2017  5:20 PM CDT   GAEL Spine with Holly Dowling PT   [facility] ([facility])    [address]  [facility] [address] [phone]              Who to contact     If you have questions or need follow up information about today's clinic visit or your schedule please contact [facility] directly at [phone].  Normal or non-critical lab and imaging results will be communicated to you by MyChart, letter or phone within 4 business days after the clinic has received the results. If you do not hear from us within 7 days, please contact the clinic through [employer] or phone. If you have a critical or abnormal lab result, we will notify you by phone as soon as possible.  Submit refill requests through [employer] or call your pharmacy and they will forward the refill request to us. Please allow 3 business days for your refill to be completed.          Additional Information About Your Visit        [employer] Information     [employer] lets you send messages to your doctor, view your test results, renew your prescriptions, schedule appointments and more. To  "sign up, go to [email or web address] . Click on \"Log in\" on the left side of the screen, which will take you to the Welcome page. Then click on \"Sign up Now\" on the right side of the page.     You will be asked to enter the access code listed below, as well as some personal information. Please follow the directions to create your username and password.     Your access code is: [email or web address]  Expires: 2017 11:14 AM     Your access code will  in 90 days. If you need help or a new code, please call your [place] clinic or [phone].        Care EveryWhere ID     This is your Care EveryWhere ID. This could be used by other organizations to access your [place] medical records  [id number]         Blood Pressure from Last 3 Encounters:   17 160/80   17 142/80   16 158/88    Weight from Last 3 Encounters:   17 86.8 kg (191 lb 4.8 oz)   16 87 kg (191 lb 14.4 oz)   16 87.6 kg (193 lb 1 oz)              We Performed the Following     MANUAL THER TECH,1+REGIONS,EA 15 MIN     NEUROMUSCULAR RE-EDUCATION     THERAPEUTIC EXERCISES        Primary Care Provider Office Phone # Fax #    Tia Ledesma PA-C [phone]       [place] [address]        Equal Access to Services     OMAR BARCENAS : Hadii aad ku hadasho Soomaali, waaxda luqadaha, qaybta kaalmada adeegyada, paul angela. So [facility] [phone].    ATENCIÓN: Si john bran, tiene a pendleton disposición servicios gratuitos de asistencia lingüística. Albertina al [phone].    We comply with applicable federal civil rights laws and [place] laws. We do not discriminate on the basis of race, color, national origin, age, disability sex, sexual orientation or gender identity.            Thank you!     Thank you for choosing [facility]  for your care. Our goal is always to provide you with " excellent care. Hearing back from our patients is one way we can continue to improve our services. Please take a few minutes to complete the written survey that you may receive in the mail after your visit with us. Thank you!             Your Updated Medication List - Protect others around you: Learn how to safely use, store and throw away your medicines at www.disposemymeds.org.          This list is accurate as of: 6/22/17  5:42 PM.  Always use your most recent med list.                   Brand Name Dispense Instructions for use Diagnosis    methocarbamol 500 MG tablet    ROBAXIN    120 tablet    Take 2 tablets (1,000 mg) by mouth 3 times daily as needed for muscle spasms    Cervical strain, subsequent encounter, Left shoulder strain, subsequent encounter, Strain of trapezius muscle, right, subsequent encounter

## 2017-06-22 NOTE — PROGRESS NOTES
Subjective:    HPI                    Objective:    System              Cervical/Thoracic Evaluation    AROM:  AROM Cervical:    Flexion:            Min loss  Extension:       Major loss  Rotation:         Left: mod loss     Right: mod loss  Side Bend:      Left: mod loss     Right:  Mod loss                               Shoulder Evaluation:  ROM:  AROM:    Flexion:  Left:  85    Right:  85                                                                           General     ROS    Assessment/Plan:      SUBJECTIVE  Subjective changes as noted by pt:  Jose reports that since last session she had to go to [place] for a  and since coming back her neck is more sore.  Reports that she had to buy a neck collar to manage the pain in her neck while in the car.  Headache worse.  L shoulder sore today.  The R knee is more sore from car trip as well (medial knee pain).  Low back sore as well.       Current pain level: 7/10     Changes in function:  None     Adverse reaction to treatment or activity:  None    OBJECTIVE  Changes in objective findings:  Yes, See physical exam section and/or daily flowsheet for response to repeated movements.          ASSESSMENT  Jose continues to require intervention to meet STG and LTG's: PT  Patient has experienced an exacerbation of symptoms.  Response to therapy has shown a worsening of  pain level  Progress made towards STG/LTG?  None    PLAN  Continue current treatment plan until patient demonstrates readiness to progress to higher level exercises.    PTA/ATC plan:  N/A    Please refer to the daily flowsheet for treatment today, total treatment time and time spent performing 1:1 timed codes.

## 2017-06-26 ENCOUNTER — THERAPY VISIT (OUTPATIENT)
Dept: PHYSICAL THERAPY | Facility: CLINIC | Age: 62
End: 2017-06-26
Payer: COMMERCIAL

## 2017-06-26 DIAGNOSIS — M25.562 ACUTE PAIN OF BOTH KNEES: ICD-10-CM

## 2017-06-26 DIAGNOSIS — M54.50 ACUTE BILATERAL LOW BACK PAIN WITHOUT SCIATICA: ICD-10-CM

## 2017-06-26 DIAGNOSIS — M25.561 ACUTE PAIN OF BOTH KNEES: ICD-10-CM

## 2017-06-26 DIAGNOSIS — M54.2 NECK PAIN: ICD-10-CM

## 2017-06-26 PROCEDURE — 97530 THERAPEUTIC ACTIVITIES: CPT | Mod: GP | Performed by: PHYSICAL THERAPIST

## 2017-06-26 PROCEDURE — 97110 THERAPEUTIC EXERCISES: CPT | Mod: GP | Performed by: PHYSICAL THERAPIST

## 2017-06-26 PROCEDURE — 97112 NEUROMUSCULAR REEDUCATION: CPT | Mod: GP | Performed by: PHYSICAL THERAPIST

## 2017-06-26 PROCEDURE — 97140 MANUAL THERAPY 1/> REGIONS: CPT | Mod: GP | Performed by: PHYSICAL THERAPIST

## 2017-06-26 NOTE — MR AVS SNAPSHOT
"              After Visit Summary   6/26/2017    Jose New    MRN: [id number]           Patient Information     Date Of Birth          1955        Visit Information        Provider Department      6/26/2017 5:20 PM Holly Dowling PT [facility]        Today's Diagnoses     Acute pain of both knees        Acute bilateral low back pain without sciatica        Neck pain           Follow-ups after your visit        Your next 10 appointments already scheduled     Jul 03, 2017  5:20 PM CDT   GAEL Spine with Holly Dowling PT   [facility] ([facility])    [address]  [facility] [id number] [phone]              Who to contact     If you have questions or need follow up information about today's clinic visit or your schedule please contact [facility] directly at [phone].  Normal or non-critical lab and imaging results will be communicated to you by [employer], letter or phone within 4 business days after the clinic has received the results. If you do not hear from us within 7 days, please contact the clinic through [employer] or phone. If you have a critical or abnormal lab result, we will notify you by phone as soon as possible.  Submit refill requests through [employer] or call your pharmacy and they will forward the refill request to us. Please allow 3 business days for your refill to be completed.          Additional Information About Your Visit        [employer] Information     [employer] lets you send messages to your doctor, view your test results, renew your prescriptions, schedule appointments and more. To sign up, go to [email or web address] . Click on \"Log in\" on the left side of the screen, which will take you to the Welcome page. Then click on \"Sign up Now\" on the right side of the page.     You will be asked to enter the " access code listed below, as well as some personal information. Please follow the directions to create your username and password.     Your access code is: [email or web address]  Expires: 2017 11:14 AM     Your access code will  in 90 days. If you need help or a new code, please call your [place] clinic or [phone].        Care EveryWhere ID     This is your Care EveryWhere ID. This could be used by other organizations to access your [place] medical records  [id number]         Blood Pressure from Last 3 Encounters:   17 160/80   17 142/80   16 158/88    Weight from Last 3 Encounters:   17 86.8 kg (191 lb 4.8 oz)   16 87 kg (191 lb 14.4 oz)   16 87.6 kg (193 lb 1 oz)              We Performed the Following     Manual Ther Tech, 1+Regions, EA 15 min     Neuromuscular Re-Education     Therapeutic Activities     Therapeutic Exercises        Primary Care Provider Office Phone # Fax #    Tia Ledesma PA-C [phone]       [facility] [address]  [facility] [address]        Equal Access to Services     OMAR BARCENAS : Hadii madhu ku hadasho Soomaali, waaxda luqadaha, qaybta kaalmada adeegyada, paul angela. So [facility] [phone].    ATENCIÓN: Si habla español, tiene a pendleton disposición servicios gratuitos de asistencia lingüística. Llame al [phone].    We comply with applicable federal civil rights laws and [place] laws. We do not discriminate on the basis of race, color, national origin, age, disability sex, sexual orientation or gender identity.            Thank you!     Thank you for choosing [facility] PH[facility] THERAP[facility]  for your care. Our goal is always to provide you with excellent care. Hearing back from our patients is one way we can continue to improve our services. Please take a few minutes to complete the written survey that you may receive in the mail after your  visit with us. Thank you!             Your Updated Medication List - Protect others around you: Learn how to safely use, store and throw away your medicines at www.disposemymeds.org.          This list is accurate as of: 6/26/17  6:55 PM.  Always use your most recent med list.                   Brand Name Dispense Instructions for use Diagnosis    methocarbamol 500 MG tablet    ROBAXIN    120 tablet    Take 2 tablets (1,000 mg) by mouth 3 times daily as needed for muscle spasms    Cervical strain, subsequent encounter, Left shoulder strain, subsequent encounter, Strain of trapezius muscle, right, subsequent encounter

## 2017-06-26 NOTE — PROGRESS NOTES
"Subjective:    HPI                    Objective:    System    Physical Exam    General     ROS    Assessment/Plan:      SUBJECTIVE  Subjective changes as noted by pt:  My neck is still really painful and I've had a bad HA all day. My knees and LB are sore also. I have not done the HEP very much since I've been back from the trip.    Current pain level:  Current Pain level: 7/10 (LB 5/10, knees 5/10)   Changes in function:  No change in functional goals in the past 2 weeks, increase PL in neck, LB, knees.     Adverse reaction to treatment or activity:  None    OBJECTIVE  Changes in objective findings:  Trial of 6\" ascending step- 10 reps with c/o medial knee pain B and fatigue.Reviewed knee bends- tolerated 15 reps then c/o medial knee pain B.  R knee AROM FLX ( heel slide supine) 90, L 100 (medial knee pain B)   CROM in sitting mod. limitation B rotation,B SB, EXT with B neck pain. Supine PROM neck rotation 75% B. Added SOR in supine- decreased HA/neck pain( PL 7 to 5/10) following manual therapy.  Reviewed TA engagement/neutral posture in sitting, standing and bending.        ASSESSMENT  Daisie continues to require intervention to meet STG and LTG's: PT  Patient is not progressing as expected.  Patient has experienced an exacerbation of symptoms.  Response to therapy has shown a worsening of  pain level and function in past 2 weeks  Progress made towards STG/LTG?  Minimal to no change in functional goals in past 2 weeks, increased PL neck, knees and back.    PLAN  Continue current treatment plan until patient demonstrates readiness to progress to higher level exercises.  The following procedures have been added:  therapeutic activities  Patient to FU with MD next week, reassess and complete UT at next visit.  PTA/ATC plan:  N/A    Please refer to the daily flowsheet for treatment today, total treatment time and time spent performing 1:1 timed codes.              "

## 2017-07-03 ENCOUNTER — THERAPY VISIT (OUTPATIENT)
Dept: PHYSICAL THERAPY | Facility: CLINIC | Age: 62
End: 2017-07-03
Payer: COMMERCIAL

## 2017-07-03 DIAGNOSIS — M54.50 ACUTE BILATERAL LOW BACK PAIN WITHOUT SCIATICA: ICD-10-CM

## 2017-07-03 DIAGNOSIS — M25.561 ACUTE PAIN OF BOTH KNEES: ICD-10-CM

## 2017-07-03 DIAGNOSIS — M54.2 NECK PAIN: ICD-10-CM

## 2017-07-03 DIAGNOSIS — M25.562 ACUTE PAIN OF BOTH KNEES: ICD-10-CM

## 2017-07-03 PROCEDURE — 97140 MANUAL THERAPY 1/> REGIONS: CPT | Mod: GP | Performed by: PHYSICAL THERAPIST

## 2017-07-03 PROCEDURE — 97112 NEUROMUSCULAR REEDUCATION: CPT | Mod: GP | Performed by: PHYSICAL THERAPIST

## 2017-07-03 PROCEDURE — 97110 THERAPEUTIC EXERCISES: CPT | Mod: GP | Performed by: PHYSICAL THERAPIST

## 2017-07-03 PROCEDURE — 97530 THERAPEUTIC ACTIVITIES: CPT | Mod: GP | Performed by: PHYSICAL THERAPIST

## 2017-07-03 NOTE — MR AVS SNAPSHOT
After Visit Summary   7/3/2017    Jose New    MRN: [id number]           Patient Information     Date Of Birth          1955        Visit Information        Provider Department      7/3/2017 5:20 PM Holly Dowling PT [facility] Therapy        Today's Diagnoses     Acute pain of both knees        Acute bilateral low back pain without sciatica        Neck pain           Follow-ups after your visit        Your next 10 appointments already scheduled     Jul 10, 2017  4:40 PM CDT   [facility] Spine with Holly Dowling PT   [facility] ([facility])    [address]  [facility] [id number] [phone]            Jul 12, 2017  6:00 PM CDT   [facility]    [address] [place] [id number] [phone]           Bring a current list of meds and any records pertaining to this visit.  For Physicals, please bring immunization records and any forms needing to be filled out.  Please arrive 10 minutes early to complete paperwork.              Who to contact     If you have questions or need follow up information about today's clinic visit or your schedule please contact [facility] PHYSICAL THERAPY directly at [phone].  Normal or non-critical lab and imaging results will be communicated to you by MyChart, letter or phone within 4 business days after the clinic has received the results. If you do not hear from us within 7 days, please contact the clinic through MyChart or phone. If you have a critical or abnormal lab result, we will notify you by phone as soon as possible.  Submit refill requests through [employer] or call your pharmacy and they will forward the refill request to us. Please allow 3 business days for your refill to be completed.        "   Additional Information About Your Visit        MyChart Information     [employer] lets you send messages to your doctor, view your test results, renew your prescriptions, schedule appointments and more. To sign up, go to [email or web address] . Click on \"Log in\" on the left side of the screen, which will take you to the Welcome page. Then click on \"Sign up Now\" on the right side of the page.     You will be asked to enter the access code listed below, as well as some personal information. Please follow the directions to create your username and password.     Your access code is: [email or web address]  Expires: 2017 11:14 AM     Your access code will  in 90 days. If you need help or a new code, please call your [place] clinic or [phone].        Care EveryWhere ID     This is your Care EveryWhere ID. This could be used by other organizations to access your [place] medical records  [id number]         Blood Pressure from Last 3 Encounters:   17 160/80   17 142/80   16 158/88    Weight from Last 3 Encounters:   17 86.8 kg (191 lb 4.8 oz)   16 87 kg (191 lb 14.4 oz)   16 87.6 kg (193 lb 1 oz)              We Performed the Following     Manual Ther Tech, 1+Regions, EA 15 min     Neuromuscular Re-Education     Therapeutic Activities     Therapeutic Exercises        Primary Care Provider Office Phone # Fax #    Tia Ledesma PA-C [phone]       [address]  [facility] [address]        Equal Access to Services     VI BARCENAS : Hadii madhu vasquez Soliyah, waaxda luqadaha, qaybta kaalmada nasrin, paul angela. So [facility] [phone].    ATENCIÓN: Si habla español, tiene a pendleton disposición servicios gratuitos de asistencia lingüística. Llame al [phone].    We comply with applicable federal civil rights laws and [place] laws. We do not discriminate on the basis of race, color, national " origin, age, disability sex, sexual orientation or gender identity.            Thank you!     Thank you for choosing [facility] PH[facility]  for your care. Our goal is always to provide you with excellent care. Hearing back from our patients is one way we can continue to improve our services. Please take a few minutes to complete the written survey that you may receive in the mail after your visit with us. Thank you!             Your Updated Medication List - Protect others around you: Learn how to safely use, store and throw away your medicines at www.disposemymeds.org.          This list is accurate as of: 7/3/17  6:52 PM.  Always use your most recent med list.                   Brand Name Dispense Instructions for use Diagnosis    methocarbamol 500 MG tablet    ROBAXIN    120 tablet    Take 2 tablets (1,000 mg) by mouth 3 times daily as needed for muscle spasms    Cervical strain, subsequent encounter, Left shoulder strain, subsequent encounter, Strain of trapezius muscle, right, subsequent encounter

## 2017-07-03 NOTE — PROGRESS NOTES
"Subjective:    HPI                    Objective:    System    Physical Exam    General     ROS    Assessment/Plan:      SUBJECTIVE  Subjective changes as noted by pt:  My neck, back and knees are feeling better this week. I think I'm recovering from the car trip 2 weeks ago. No HA today. I'm doing more of the HEP this week also.     Current pain level:  Current Pain level: 5/10 (all)   Changes in function:  Yes (See Goal flowsheet attached for changes in current functional level)     Adverse reaction to treatment or activity:  None    OBJECTIVE  Changes in objective findings:  Yes, increased CROM rotation B in sitting- 70%, increased AROM shld FLX standing: R 100, L 95. Added wall push up.  6\" ascending step: 12 reps B- c/o medial knee pain.  Minimal to no change in neck PL following manual therapy in a reclined position.        ASSESSMENT  Daisie continues to require intervention to meet STG and LTG's: PT  Patient is responding slower than expected.  Response to therapy has shown an improvement in  pain level, ROM  and function- neck/HA  Response to therapy has shown lack of progress in function in knees LB  Progress made towards STG/LTG?  Yes (See Goal flowsheet attached for updates on achievement of STG and LTG)    PLAN  Current treatment program is being advanced to more complex exercises.  The following procedures have been added:  strengthening    PTA/ATC plan:  N/A    Please refer to the daily flowsheet for treatment today, total treatment time and time spent performing 1:1 timed codes.              "

## 2017-07-10 ENCOUNTER — THERAPY VISIT (OUTPATIENT)
Dept: PHYSICAL THERAPY | Facility: CLINIC | Age: 62
End: 2017-07-10
Payer: COMMERCIAL

## 2017-07-10 DIAGNOSIS — M25.561 ACUTE PAIN OF BOTH KNEES: ICD-10-CM

## 2017-07-10 DIAGNOSIS — M54.2 NECK PAIN: ICD-10-CM

## 2017-07-10 DIAGNOSIS — M25.562 ACUTE PAIN OF BOTH KNEES: ICD-10-CM

## 2017-07-10 DIAGNOSIS — M54.50 ACUTE BILATERAL LOW BACK PAIN WITHOUT SCIATICA: ICD-10-CM

## 2017-07-10 PROCEDURE — 97140 MANUAL THERAPY 1/> REGIONS: CPT | Mod: GP | Performed by: PHYSICAL THERAPIST

## 2017-07-10 PROCEDURE — 97110 THERAPEUTIC EXERCISES: CPT | Mod: GP | Performed by: PHYSICAL THERAPIST

## 2017-07-10 NOTE — PROGRESS NOTES
"Subjective:    HPI                    Objective:    System    Physical Exam    General     ROS    Assessment/Plan:      PROGRESS  REPORT    Progress reporting period is from 5/15/17 to 7/10/17.       SUBJECTIVE  Subjective changes noted by patient: I have no HA today but it fluctuates and I get one about 3x week. My neck and back are feeling fairly good today, but this weekend I was in a lot of pain everywhere in my body. My knees were sore when sitting at work today after 1 hour- it helps a lot getting up often at work. I'm taking the pain medications in the evening since they make me drowsy in daytime. I'm doing some of my home exercises each day.    Current pain level is 5/10 back, neck ,knees      Previous pain level was  5/10  .   Changes in function:  Yes (See Goal flowsheet attached for changes in current functional level)  Adverse reaction to treatment or activity: None  Response to therapy has shown improvement in pain, ROM and function in neck/HA sx's.  Response to therapy has shown lack of progress in function of knees and back and UE ROM.  OBJECTIVE  Changes noted in objective findings: increased CROM sitting : rotation R 60% L 60% SB  R 50% L 50%  FLX 60%  EXT 40%, point tenderness B UT's, SHREE's with inability to lay supine for manual therapy treatment secondary to c/o neck/ back pain.    Minimal change in shoulder AROM standing: FLX R 100 L 100- c/o shld/neck pain     Minimal change in LROM: FLX 70% + LBP EXT 60% +,  fair sitting posture with verbal cues for head position and TA recruitment to avoid slouch posture.    Minimal change in Knee AROM sitting : FLX R 90+   L 90+    PROM: FLX R 100+ L 102+, EXT 2 B  MMT knee EXT 4+/5 B with pain  6\" ascending step increases medial knee pain B at 10 reps    ASSESSMENT/PLAN  Updated problem list and treatment plan: Diagnosis 1:  Neck pain/strain  Pain -  hot/cold therapy, manual therapy, self management, education, directional preference exercise and home " program  Decreased ROM/flexibility - manual therapy, therapeutic exercise and home program  Decreased joint mobility - manual therapy, therapeutic exercise and home program  Impaired muscle performance - neuro re-education and home program  Decreased function - therapeutic activities and home program  Impaired posture - neuro re-education and home program  Diagnosis 2:  LBP/lumbar strain   Pain -  hot/cold therapy, self management, education and home program  Decreased ROM/flexibility - therapeutic exercise and home program  Impaired muscle performance - neuro re-education and home program  Decreased function - therapeutic activities and home program  Impaired posture - neuro re-education and home program  Diagnosis 3:  B knee pain  Pain -  hot/cold therapy, self management, education and home program  Decreased ROM/flexibility - therapeutic exercise and home program  Decreased joint mobility - therapeutic exercise and home program  Decreased strength - therapeutic exercise, therapeutic activities and home program  Impaired gait - home program  Impaired muscle performance - neuro re-education and home program  Decreased function - therapeutic activities and home program   STG/LTGs have been met or progress has been made towards goals:  Yes (See Goal flow sheet completed today.)  Assessment of Progress: The patient has had set backs in their progress.  Patient has shown improvement in regard to some the STG's related to neck, back and knees however progress has been very slow.  Self Management Plans:  Patient has been instructed in a home treatment program.  Patient  has been instructed in self management of symptoms.  I have re-evaluated this patient and find that the nature, scope, duration and intensity of the therapy is appropriate for the medical condition of the patient.  Jose continues to require the following intervention to meet STG and LTG's:  PT    Recommendations:  This patient would benefit from  further evaluation. MD appointment scheduled on 7/12/17 please advise regarding other treatment options for patient or continued PT.    Please refer to the daily flowsheet for treatment today, total treatment time and time spent performing 1:1 timed codes.

## 2017-07-10 NOTE — MR AVS SNAPSHOT
After Visit Summary   7/10/2017    Jose eNw    MRN: [id number]           Patient Information     Date Of Birth          1955        Visit Information        Provider Department      7/10/2017 4:40 PM Holly Dowling PT [facility] Physical Therapy        Today's Diagnoses     Acute pain of both knees        Acute bilateral low back pain without sciatica        Neck pain           Follow-ups after your visit        Your next 10 appointments already scheduled     Jul 12, 2017  6:00 PM CDT   Office Visit [facility]   [facility]    [address] [place] [id number] [phone]           Bring a current list of meds and any records pertaining to this visit.  For Physicals, please bring immunization records and any forms needing to be filled out.  Please arrive 10 minutes early to complete paperwork.              Who to contact     If you have questions or need follow up information about today's clinic visit or your schedule please contact [facility] PHYSICAL THERAPY directly at [phone].  Normal or non-critical lab and imaging results will be communicated to you by [employer], letter or phone within 4 business days after the clinic has received the results. If you do not hear from us within 7 days, please contact the clinic through [employer] or phone. If you have a critical or abnormal lab result, we will notify you by phone as soon as possible.  Submit refill requests through [employer] or call your pharmacy and they will forward the refill request to us. Please allow 3 business days for your refill to be completed.          Additional Information About Your Visit        MyChart Information     [employer] lets you send messages to your doctor, view your test results, renew your prescriptions, schedule appointments and more. To sign up, go to  [email or web address] . Click on \"Log in\" on the left side of the screen, which will take you to the Welcome page. Then click on \"Sign up Now\" on the right side of the page.     You will be asked to enter the access code listed below, as well as some personal information. Please follow the directions to create your username and password.     Your access code is: [email or web address]  Expires: 10/8/2017  7:44 PM     Your access code will  in 90 days. If you need help or a new code, please call your [place] clinic or [phone].        Care EveryWhere ID     This is your Care EveryWhere ID. This could be used by other organizations to access your [place] medical records  [id number]         Blood Pressure from Last 3 Encounters:   17 160/80   17 142/80   16 158/88    Weight from Last 3 Encounters:   17 86.8 kg (191 lb 4.8 oz)   16 87 kg (191 lb 14.4 oz)   16 87.6 kg (193 lb 1 oz)              We Performed the Following     GAEL Progress Notes Report     Manual Ther Tech, 1+Regions, EA 15 min     Therapeutic Exercises        Primary Care Provider Office Phone # Fax #    Tia Ledesma PA-C [phone]       [facility] [address] [place] [address]        Equal Access to Services     OMAR BARCENAS AH: Hadii madhu riverao Soliyah, waaxda luqadaha, qaybta kaalmada adeegyada, paul angela. So [facility] [phone].    ATENCIÓN: Si sofiala shant, tiene a pendleton disposición servicios gratuitos de asistencia lingüística. Albertina al [phone].    We comply with applicable federal civil rights laws and [place] laws. We do not discriminate on the basis of race, color, national origin, age, disability sex, sexual orientation or gender identity.            Thank you!     Thank you for choosing [facility] PHYSICAL THERAPY  for your care. Our goal is always to provide you with excellent " care. Hearing back from our patients is one way we can continue to improve our services. Please take a few minutes to complete the written survey that you may receive in the mail after your visit with us. Thank you!             Your Updated Medication List - Protect others around you: Learn how to safely use, store and throw away your medicines at www.disposemymeds.org.          This list is accurate as of: 7/10/17  7:44 PM.  Always use your most recent med list.                   Brand Name Dispense Instructions for use Diagnosis    methocarbamol 500 MG tablet    ROBAXIN    120 tablet    Take 2 tablets (1,000 mg) by mouth 3 times daily as needed for muscle spasms    Cervical strain, subsequent encounter, Left shoulder strain, subsequent encounter, Strain of trapezius muscle, right, subsequent encounter

## 2017-07-12 ENCOUNTER — OFFICE VISIT (OUTPATIENT)
Dept: FAMILY MEDICINE | Facility: CLINIC | Age: 62
End: 2017-07-12
Payer: COMMERCIAL

## 2017-07-12 VITALS
SYSTOLIC BLOOD PRESSURE: 136 MMHG | HEART RATE: 80 BPM | OXYGEN SATURATION: 100 % | DIASTOLIC BLOOD PRESSURE: 86 MMHG | TEMPERATURE: 98.1 F | RESPIRATION RATE: 18 BRPM

## 2017-07-12 DIAGNOSIS — S16.1XXD CERVICAL STRAIN, SUBSEQUENT ENCOUNTER: ICD-10-CM

## 2017-07-12 DIAGNOSIS — S33.5XXS SPRAIN OF LUMBAR REGION, SEQUELA: ICD-10-CM

## 2017-07-12 DIAGNOSIS — S46.912D LEFT SHOULDER STRAIN, SUBSEQUENT ENCOUNTER: ICD-10-CM

## 2017-07-12 DIAGNOSIS — M25.562 ACUTE PAIN OF BOTH KNEES: Primary | ICD-10-CM

## 2017-07-12 DIAGNOSIS — M25.561 ACUTE PAIN OF BOTH KNEES: Primary | ICD-10-CM

## 2017-07-12 DIAGNOSIS — S46.811D STRAIN OF TRAPEZIUS MUSCLE, RIGHT, SUBSEQUENT ENCOUNTER: ICD-10-CM

## 2017-07-12 PROCEDURE — 99213 OFFICE O/P EST LOW 20 MIN: CPT | Performed by: PHYSICIAN ASSISTANT

## 2017-07-12 RX ORDER — METHOCARBAMOL 500 MG/1
1000 TABLET, FILM COATED ORAL 3 TIMES DAILY PRN
Qty: 120 TABLET | Refills: 0 | Status: SHIPPED | OUTPATIENT
Start: 2017-07-12 | End: 2017-09-01

## 2017-07-12 ASSESSMENT — PAIN SCALES - GENERAL: PAINLEVEL: SEVERE PAIN (6)
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
## 2017-07-12 NOTE — PATIENT INSTRUCTIONS
Continue with robaxin in evening as needed for pain.   Return urgently if any change in symptoms.    Work on diet and weight loss.   Continue with physical therapy
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
## 2017-07-19 ENCOUNTER — THERAPY VISIT (OUTPATIENT)
Dept: PHYSICAL THERAPY | Facility: CLINIC | Age: 62
End: 2017-07-19
Payer: COMMERCIAL

## 2017-07-19 DIAGNOSIS — M54.2 NECK PAIN: ICD-10-CM

## 2017-07-19 DIAGNOSIS — M54.50 ACUTE BILATERAL LOW BACK PAIN WITHOUT SCIATICA: ICD-10-CM

## 2017-07-19 DIAGNOSIS — M25.562 ACUTE PAIN OF BOTH KNEES: ICD-10-CM

## 2017-07-19 DIAGNOSIS — M25.561 ACUTE PAIN OF BOTH KNEES: ICD-10-CM

## 2017-07-19 PROCEDURE — 97140 MANUAL THERAPY 1/> REGIONS: CPT | Mod: GP | Performed by: PHYSICAL THERAPIST

## 2017-07-19 PROCEDURE — 97530 THERAPEUTIC ACTIVITIES: CPT | Mod: GP | Performed by: PHYSICAL THERAPIST

## 2017-07-19 PROCEDURE — 97110 THERAPEUTIC EXERCISES: CPT | Mod: GP | Performed by: PHYSICAL THERAPIST
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
## 2017-07-24 ENCOUNTER — TELEPHONE (OUTPATIENT)
Dept: FAMILY MEDICINE | Facility: CLINIC | Age: 62
End: 2017-07-24

## 2017-07-24 NOTE — LETTER
[facility]  [address] [place]  [address]  [phone]      July 24, 2017      Jose New  [address] [place] [id number]            Dear Jose ,    Our records indicate you are due for the following screenings:    Physical with pap smear  Mammogram  Colonoscopy    It is important to keep up on all preventative cancer screenings.  Please call to schedule an appointment at [phone].    If you have already completed these screenings outside the [facility] system please contact us so we can obtain the records and update your chart.    We look forward to seeing you soon.      Sincerely,    [facility]  Quality Care Team

## 2017-07-24 NOTE — TELEPHONE ENCOUNTER
Panel Management Review      7/12/2017    Fail List measure: PAP, MAMMO, COLON      Patient is due/failing the following:   COLONOSCOPY, MAMMOGRAM, PAP and PHYSICAL    Action needed:   Patient needs office visit for above.    Type of outreach:    Sent letter.    Questions for provider review:    None                                                                                                                                    Rica Alaniz

## 2017-07-28 ENCOUNTER — THERAPY VISIT (OUTPATIENT)
Dept: PHYSICAL THERAPY | Facility: CLINIC | Age: 62
End: 2017-07-28
Payer: COMMERCIAL

## 2017-07-28 DIAGNOSIS — M25.562 ACUTE PAIN OF BOTH KNEES: ICD-10-CM

## 2017-07-28 DIAGNOSIS — M25.561 ACUTE PAIN OF BOTH KNEES: ICD-10-CM

## 2017-07-28 DIAGNOSIS — M54.50 ACUTE BILATERAL LOW BACK PAIN WITHOUT SCIATICA: ICD-10-CM

## 2017-07-28 DIAGNOSIS — M54.2 NECK PAIN: ICD-10-CM

## 2017-07-28 PROCEDURE — 97110 THERAPEUTIC EXERCISES: CPT | Mod: GP | Performed by: PHYSICAL THERAPIST

## 2017-07-28 PROCEDURE — 97140 MANUAL THERAPY 1/> REGIONS: CPT | Mod: GP | Performed by: PHYSICAL THERAPIST

## 2017-07-28 NOTE — PROGRESS NOTES
"Subjective:    HPI                    Objective:    System    Physical Exam    General     ROS    Assessment/Plan:      SUBJECTIVE  Subjective changes as noted by pt:  I'm doing the HEP ok- grouped the exercises togethe so not overdoing with them. I notice the HA more when driving to work- then it lessens as day goes on. I'm having more shooting pains in the R medial knee today. Neck and back is feeling the same as last session PT.    Current pain level: 5/10 neck and back, R knee 6/10   Changes in function:  Yes (See Goal flowsheet attached for changes in current functional level)     Adverse reaction to treatment or activity:  None    OBJECTIVE  Changes in objective findings: minimal to no change in neck, back, knees since 7/19/17- slouched sitting posture with forward head position/rounded shoulders- improves with verbal cues. shld AROM:  B pain neck/shld's standing.   Added wall sit(10\" hold- increased R knee pain, back and neck pain) for quad strengthening since knee bends were increasing back pain.   Muscle tension/point tenderness B UT's/levator- increased PL 7/10 in neck, back and knees following manual treatment/ exercises.          ASSESSMENT  Daisie continues to require intervention to meet STG and LTG's: PT  No change of symptoms has been noted since 7/19/17  Patient is not progressing as expected.  Response to therapy has shown an improvement in  ROM neck , muscle control and posture  Response to therapy has shown lack of progress in  pain level and function knees or back  Progress made towards STG/LTG?  Yes (See Goal flowsheet attached for updates on achievement of STG and LTG)    PLAN  Current treatment program is being advanced to more complex exercises.  The following procedures have been added:  strengthening    PTA/ATC plan:  N/A    Please refer to the daily flowsheet for treatment today, total treatment time and time spent performing 1:1 timed codes.              "

## 2017-07-28 NOTE — MR AVS SNAPSHOT
"              After Visit Summary   7/28/2017    Jose New    MRN: [id number]           Patient Information     Date Of Birth          1955        Visit Information        Provider Department      7/28/2017 2:40 PM Holly Dowling PT [facility] Physical Therapy        Today's Diagnoses     Acute pain of both knees        Acute bilateral low back pain without sciatica        Neck pain           Follow-ups after your visit        Your next 10 appointments already scheduled     Aug 11, 2017  2:40 PM CDT   [facility] Spine with Holly Dowling PT   [facility] (Health system)    [address]  [facility] [id number] [phone]              Who to contact     If you have questions or need follow up information about today's clinic visit or your schedule please contact [facility] directly at [phone].  Normal or non-critical lab and imaging results will be communicated to you by leemailhart, letter or phone within 4 business days after the clinic has received the results. If you do not hear from us within 7 days, please contact the clinic through leemailhart or phone. If you have a critical or abnormal lab result, we will notify you by phone as soon as possible.  Submit refill requests through [employer] or call your pharmacy and they will forward the refill request to us. Please allow 3 business days for your refill to be completed.          Additional Information About Your Visit        [employer]hart Information     [employer] lets you send messages to your doctor, view your test results, renew your prescriptions, schedule appointments and more. To sign up, go to [email or web address] . Click on \"Log in\" on the left side of the screen, which will take you to the Welcome page. Then click on \"Sign up Now\" on the right side of the page.     You will be asked to enter the " access code listed below, as well as some personal information. Please follow the directions to create your username and password.     Your access code is: [email or web address]  Expires: 10/8/2017  7:44 PM     Your access code will  in 90 days. If you need help or a new code, please call your [facility] or [phone].        Care EveryWhere ID     This is your Care EveryWhere ID. This could be used by other organizations to access your [place] medical records  [id number]         Blood Pressure from Last 3 Encounters:   17 136/86   17 160/80   17 142/80    Weight from Last 3 Encounters:   17 86.8 kg (191 lb 4.8 oz)   16 87 kg (191 lb 14.4 oz)   16 87.6 kg (193 lb 1 oz)              We Performed the Following     Manual Ther Tech, 1+Regions, EA 15 min     Therapeutic Exercises        Primary Care Provider Office Phone # Fax #    Tia Ledesma PA-C [phone]       [facility] [address] [place] [address] [address]  [facility] [address]        Equal Access to Services     OMAR BARCENAS : Hadii aad ku hadasho Soomaali, waaxda luqadaha, qaybta kaalmada adeegyada, waxay hectorin haynolvian roberto angela. So [facility] [phone].    ATENCIÓN: Si habla español, tiene a pendleton disposición servicios gratuitos de asistencia lingüística. [facility] [phone].    We comply with applicable federal civil rights laws and [place] laws. We do not discriminate on the basis of race, color, national origin, age, disability sex, sexual orientation or gender identity.            Thank you!     Thank you for choosing [facility] PHYSICAL THERAP[facility]  for your care. Our goal is always to provide you with excellent care. Hearing back from our patients is one way we can continue to improve our services. Please take a few minutes to complete the written survey that you may receive in the mail after your visit with us. Thank you!             Your Updated  Medication List - Protect others around you: Learn how to safely use, store and throw away your medicines at www.disposemymeds.org.          This list is accurate as of: 7/28/17  4:32 PM.  Always use your most recent med list.                   Brand Name Dispense Instructions for use Diagnosis    methocarbamol 500 MG tablet    ROBAXIN    120 tablet    Take 2 tablets (1,000 mg) by mouth 3 times daily as needed for muscle spasms    Cervical strain, subsequent encounter, Left shoulder strain, subsequent encounter, Strain of trapezius muscle, right, subsequent encounter

## 2017-08-11 ENCOUNTER — THERAPY VISIT (OUTPATIENT)
Dept: PHYSICAL THERAPY | Facility: CLINIC | Age: 62
End: 2017-08-11
Payer: COMMERCIAL

## 2017-08-11 DIAGNOSIS — M54.50 ACUTE BILATERAL LOW BACK PAIN WITHOUT SCIATICA: ICD-10-CM

## 2017-08-11 DIAGNOSIS — M25.562 ACUTE PAIN OF BOTH KNEES: ICD-10-CM

## 2017-08-11 DIAGNOSIS — M54.2 NECK PAIN: ICD-10-CM

## 2017-08-11 DIAGNOSIS — M25.561 ACUTE PAIN OF BOTH KNEES: ICD-10-CM

## 2017-08-11 PROCEDURE — 97140 MANUAL THERAPY 1/> REGIONS: CPT | Mod: GP | Performed by: PHYSICAL THERAPIST

## 2017-08-11 PROCEDURE — 97110 THERAPEUTIC EXERCISES: CPT | Mod: GP | Performed by: PHYSICAL THERAPIST

## 2017-08-11 PROCEDURE — 97530 THERAPEUTIC ACTIVITIES: CPT | Mod: GP | Performed by: PHYSICAL THERAPIST

## 2017-08-11 NOTE — PROGRESS NOTES
"Subjective:    HPI                    Objective:    System    Physical Exam    General     ROS    Assessment/Plan:      PROGRESS  REPORT    Progress reporting period is from 7/10/17 to 8/11/17.       SUBJECTIVE  Subjective changes noted by patient:  I don't think the pain in my neck, back,shoulders or knees has improved or changed in the past 4 weeks. Some days the pain is worse than others and I do not know why- doesn't seem to be related to work activity. They let me take break at work every 10-15 minutes and stand up which allows me to continue to work. I do some of the exercises each day and some of them make my pain worse. I'm still getting headaches 3-4 times per week. I take the pain medication in the evening which \"knocks\" me out for bed. I get \"shooting\" pains in both knees at times. Sitting and driving seems to aggravate the neck and back. Reaching into the cupboards with either of my arms is limited and painful.       Current pain level is  Current Pain level: 6/10 neck, back, knees and shoulders     Previous pain level was  6/10  .   Changes in function:  Minimal to no change in functional goals in the past 4-6 weeks.See goal sheet for details.  Adverse reaction to treatment or activity: None    OBJECTIVE  Changes noted in objective findings:  CROM: R rotation 60% neck pain, L rotation 60% neck pain, EXT 30% neck pain. Slouched sitting posture with forward head position- corrects with verbal cues.  Muscle tension/point tenderness B UT's, levator, cervical paraspinals and SHREE's.    AROM: shld FLX L 100+ neck/shld  R 105 + neck/shld, B ABD 95+ neck shld, B  ER 80 + shld, IR hand to lateral buttock B pain in shlds   Strength: shld FLX B 4-/5 pain shld/neck,  ABD B 3+/5 pain shld/neck, ER B 4-/5 pain shld, IR 4+/5 pain shld     LROM: FLX 60% with LBP,  EXT 20%  central LBP, L SB 70% RLBP,  R SB 60% L LBP    Knee AROM sitting ( difficulty laying supine due to neck pain): L FLX 95 pain medial knee,  P-flx 100 " "+  R  FLX 95 pain medial knee, P-flx 100+   Strength: quad B 5-/5 pain medial knee B, hamstring B 5-/5   B knee pain with 10 reps of ascending 6\" step    Reviewed HEP with focus on good posture sitting at work, good body mechanics with ADL's, ice/heat to control pain and walking for cardio exercise to facilitate healing.            ASSESSMENT/PLAN  Updated problem list and treatment plan:  Diagnosis 1:  Neck pain/strain/B shld pain  Pain -  hot/cold therapy, manual therapy, self management, education, directional preference exercise and home program  Decreased ROM/flexibility - manual therapy, therapeutic exercise and home program  Decreased joint mobility - manual therapy, therapeutic exercise and home program  Impaired muscle performance - neuro re-education and home program  Decreased function - therapeutic activities and home program  Impaired posture - neuro re-education and home program  Diagnosis 2:  LBP/lumbar strain   Pain -  hot/cold therapy, self management, education and home program  Decreased ROM/flexibility - therapeutic exercise and home program  Impaired muscle performance - neuro re-education and home program  Decreased function - therapeutic activities and home program  Impaired posture - neuro re-education and home program  Diagnosis 3:  B knee pain  Pain -  hot/cold therapy, self management, education and home program  Decreased ROM/flexibility - therapeutic exercise and home program  Decreased joint mobility - therapeutic exercise and home program  Decreased strength - therapeutic exercise, therapeutic activities and home program  Impaired gait - home program  Impaired muscle performance     STG/LTGs have been met or progress has been made towards goals:  Minimal to no improvement in goals in past 4-6 weeks.  Assessment of Progress: The patient's progress has plateaued.  The patient's progress has slowed.  Self Management Plans:  Patient has been instructed in a home treatment program.  Patient "  has been instructed in self management of symptoms.  I have re-evaluated this patient and find that the nature, scope, duration and intensity of the therapy is appropriate for the medical condition of the patient.  Jose continues to require the following intervention to meet STG and LTG's:  PT    Recommendations:  This patient would benefit from further evaluation.  Patient may benefit from an evaluation at a pain clinic secondary to lack of progress toward functional goals and persistent pain level in all areas being treated. Please advise.  Patient did not schedule further PT therefore DC.  Please refer to the daily flowsheet for treatment today, total treatment time and time spent performing 1:1 timed codes.

## 2017-08-11 NOTE — MR AVS SNAPSHOT
"              After Visit Summary   2017    Jose New    MRN: [id number]           Patient Information     Date Of Birth          1955        Visit Information        Provider Department      2017 2:40 PM Holly Dowling PT [facility] Athletic [facility] Physical [facility]        Today's Diagnoses     Acute pain of both knees        Acute bilateral low back pain without sciatica        Neck pain           Follow-ups after your visit        Who to contact     If you have questions or need follow up information about today's clinic visit or your schedule please contact [facility] ATHLETIC [facility] PHYSICAL [facility] directly at [phone].  Normal or non-critical lab and imaging results will be communicated to you by Mobile Labshart, letter or phone within 4 business days after the clinic has received the results. If you do not hear from us within 7 days, please contact the clinic through Mobile Labshart or phone. If you have a critical or abnormal lab result, we will notify you by phone as soon as possible.  Submit refill requests through [employer] or call your pharmacy and they will forward the refill request to us. Please allow 3 business days for your refill to be completed.          Additional Information About Your Visit        MyChart Information     [employer] lets you send messages to your doctor, view your test results, renew your prescriptions, schedule appointments and more. To sign up, go to [email or web address] . Click on \"Log in\" on the left side of the screen, which will take you to the Welcome page. Then click on \"Sign up Now\" on the right side of the page.     You will be asked to enter the access code listed below, as well as some personal information. Please follow the directions to create your username and password.     Your access code is: [email or web address]  Expires: 10/8/2017  7:44 PM     Your access code will  in 90 days. If you need help or a new code, " please call your [place] clinic or [phone].        Care EveryWhere ID     This is your Care EveryWhere ID. This could be used by other organizations to access your [place] medical records  [id number]         Blood Pressure from Last 3 Encounters:   07/12/17 136/86   05/18/17 160/80   04/07/17 142/80    Weight from Last 3 Encounters:   04/07/17 86.8 kg (191 lb 4.8 oz)   04/08/16 87 kg (191 lb 14.4 oz)   02/29/16 87.6 kg (193 lb 1 oz)              We Performed the Following     GAEL Progress Notes Report     Manual Ther Tech, 1+Regions, EA 15 min     Therapeutic Activities     Therapeutic Exercises        Primary Care Provider Office Phone # Fax #    Tia Ledesma PA-C [phone] [address]  [place] [facility] [address]        Equal Access to Services     [facility]: Hadii aad ku hadasho Soomaali, waaxda luqadaha, qaybta kaalmada adeegyada, waxay hectorin hayaan roberto rooney . So [facility] [phone].    ATENCIÓN: Si habla español, tiene a pendleton disposición servicios gratuitos de asistencia lingüística. Shylaame al [phone].    We comply with applicable federal civil rights laws and [place] laws. We do not discriminate on the basis of race, color, national origin, age, disability sex, sexual orientation or gender identity.            Thank you!     Thank you for choosing [facility] PHYSICAL THERAP[facility]  for your care. Our goal is always to provide you with excellent care. Hearing back from our patients is one way we can continue to improve our services. Please take a few minutes to complete the written survey that you may receive in the mail after your visit with us. Thank you!             Your Updated Medication List - Protect others around you: Learn how to safely use, store and throw away your medicines at www.disposemymeds.org.          This list is accurate as of: 8/11/17  4:51 PM.  Always use your most recent med list.                    Brand Name Dispense Instructions for use Diagnosis    methocarbamol 500 MG tablet    ROBAXIN    120 tablet    Take 2 tablets (1,000 mg) by mouth 3 times daily as needed for muscle spasms    Cervical strain, subsequent encounter, Left shoulder strain, subsequent encounter, Strain of trapezius muscle, right, subsequent encounter

## 2017-08-16 ENCOUNTER — OFFICE VISIT (OUTPATIENT)
Dept: FAMILY MEDICINE | Facility: CLINIC | Age: 62
End: 2017-08-16
Payer: COMMERCIAL

## 2017-08-16 VITALS
OXYGEN SATURATION: 98 % | DIASTOLIC BLOOD PRESSURE: 82 MMHG | RESPIRATION RATE: 18 BRPM | TEMPERATURE: 98.1 F | HEART RATE: 80 BPM | SYSTOLIC BLOOD PRESSURE: 132 MMHG

## 2017-08-16 DIAGNOSIS — S16.1XXS CERVICAL STRAIN, SEQUELA: ICD-10-CM

## 2017-08-16 DIAGNOSIS — S46.819S STRAIN OF TRAPEZIUS MUSCLE, UNSPECIFIED LATERALITY, SEQUELA: ICD-10-CM

## 2017-08-16 DIAGNOSIS — M25.561 CHRONIC PAIN OF BOTH KNEES: Primary | ICD-10-CM

## 2017-08-16 DIAGNOSIS — G89.29 CHRONIC PAIN OF BOTH KNEES: Primary | ICD-10-CM

## 2017-08-16 DIAGNOSIS — M25.562 CHRONIC PAIN OF BOTH KNEES: Primary | ICD-10-CM

## 2017-08-16 DIAGNOSIS — S33.5XXS SPRAIN OF LUMBAR REGION, SEQUELA: ICD-10-CM

## 2017-08-16 PROCEDURE — 99213 OFFICE O/P EST LOW 20 MIN: CPT | Performed by: PHYSICIAN ASSISTANT

## 2017-08-16 ASSESSMENT — PAIN SCALES - GENERAL: PAINLEVEL: SEVERE PAIN (6)
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
## 2017-08-16 NOTE — MR AVS SNAPSHOT
After Visit Summary   8/16/2017    Jsoe New    MRN: [id number]           Patient Information     Date Of Birth          1955        Visit Information        Provider Department      8/16/2017 4:40 PM Tia Ledesma PA-C [facility]        Today's Diagnoses     Chronic pain of both knees    -  1    Strain of trapezius muscle, unspecified laterality, sequela        Cervical strain, sequela        Sprain of lumbar region, sequela          Care Instructions    Follow up with orthopedics at [facility] ([phone]).    Return urgently if any change in symptoms.                Follow-ups after your visit        Additional Services     ORTHOPEDICS ADULT REFERRAL       Your provider has referred you to: FM: [facility] [phone]   [email or web address]    Please be aware that coverage of these services is subject to the terms and limitations of your health insurance plan.  Call member services at your health plan with any benefit or coverage questions.      Please bring the following to your appointment:    >>   Any x-rays, CTs or MRIs which have been performed.  Contact the facility where they were done to arrange for  prior to your scheduled appointment.    >>   List of current medications   >>   This referral request   >>   Any documents/labs given to you for this referral                  Who to contact     If you have questions or need follow up information about today's clinic visit or your schedule please contact [facility] directly at [phone].  Normal or non-critical lab and imaging results will be communicated to you by MyChart, letter or phone within 4 business days after the clinic has received the results. If you do not hear from us within 7 days, please  "contact the clinic through [employer] or phone. If you have a critical or abnormal lab result, we will notify you by phone as soon as possible.  Submit refill requests through [employer] or call your pharmacy and they will forward the refill request to us. Please allow 3 business days for your refill to be completed.          Additional Information About Your Visit        [employer] Information     [employer] lets you send messages to your doctor, view your test results, renew your prescriptions, schedule appointments and more. To sign up, go to [email or web address] . Click on \"Log in\" on the left side of the screen, which will take you to the Welcome page. Then click on \"Sign up Now\" on the right side of the page.     You will be asked to enter the access code listed below, as well as some personal information. Please follow the directions to create your username and password.     Your access code is: [email or web address]  Expires: 10/8/2017  7:44 PM     Your access code will  in 90 days. If you need help or a new code, please call your [place] clinic or [phone].        Care EveryWhere ID     This is your Care EveryWhere ID. This could be used by other organizations to access your [place] medical records  [id number]        Your Vitals Were     Pulse Temperature Respirations Pulse Oximetry Breastfeeding?       80 98.1  F (36.7  C) (Oral) 18 98% No        Blood Pressure from Last 3 Encounters:   17 132/82   17 136/86   17 160/80    Weight from Last 3 Encounters:   17 86.8 kg (191 lb 4.8 oz)   16 87 kg (191 lb 14.4 oz)   16 87.6 kg (193 lb 1 oz)              We Performed the Following     ORTHOPEDICS ADULT REFERRAL        Primary Care Provider Office Phone # Fax #    Tia Ledesma PA-C [phone] [address]  [place] [address]        Equal Access to Services     OMAR BARCENAS AH: Hadii madhu Mabry, lillyda vamsi, qachapin dickson " paul alexandramargaretteguillermina la'aan ah. [facility] [phone].    ATENCIÓN: Si habla shant, tiene a pendleton disposición servicios gratuitos de asistencia lingüística. Albertina al [phone].    We comply with applicable federal civil rights laws and [place] laws. We do not discriminate on the basis of race, color, national origin, age, disability sex, sexual orientation or gender identity.            Thank you!     Thank you for choosing [facility]  for your care. Our goal is always to provide you with excellent care. Hearing back from our patients is one way we can continue to improve our services. Please take a few minutes to complete the written survey that you may receive in the mail after your visit with us. Thank you!             Your Updated Medication List - Protect others around you: Learn how to safely use, store and throw away your medicines at www.disposemymeds.org.          This list is accurate as of: 8/16/17  5:11 PM.  Always use your most recent med list.                   Brand Name Dispense Instructions for use Diagnosis    methocarbamol 500 MG tablet    ROBAXIN    120 tablet    Take 2 tablets (1,000 mg) by mouth 3 times daily as needed for muscle spasms    Cervical strain, subsequent encounter, Left shoulder strain, subsequent encounter, Strain of trapezius muscle, right, subsequent encounter

## 2017-08-16 NOTE — PATIENT INSTRUCTIONS
Follow up with orthopedics at [facility] ([phone]).    Return urgently if any change in symptoms.
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
## 2017-08-29 ENCOUNTER — OFFICE VISIT (OUTPATIENT)
Dept: ORTHOPEDICS | Facility: CLINIC | Age: 62
End: 2017-08-29
Payer: COMMERCIAL

## 2017-08-29 VITALS — SYSTOLIC BLOOD PRESSURE: 146 MMHG | HEART RATE: 79 BPM | DIASTOLIC BLOOD PRESSURE: 89 MMHG

## 2017-08-29 DIAGNOSIS — M50.30 DEGENERATIVE DISC DISEASE, CERVICAL: Primary | ICD-10-CM

## 2017-08-29 DIAGNOSIS — M54.16 LUMBAR RADICULAR PAIN: ICD-10-CM

## 2017-08-29 DIAGNOSIS — M54.12 CERVICAL RADICULAR PAIN: ICD-10-CM

## 2017-08-29 DIAGNOSIS — M51.369 DEGENERATIVE DISC DISEASE, LUMBAR: ICD-10-CM

## 2017-08-29 PROCEDURE — 99214 OFFICE O/P EST MOD 30 MIN: CPT | Performed by: PREVENTIVE MEDICINE

## 2017-08-29 RX ORDER — DICLOFENAC SODIUM 75 MG/1
75 TABLET, DELAYED RELEASE ORAL 2 TIMES DAILY PRN
Qty: 40 TABLET | Refills: 1 | Status: SHIPPED | OUTPATIENT
Start: 2017-08-29 | End: 2018-01-11

## 2017-08-29 RX ORDER — PREDNISONE 20 MG/1
40 TABLET ORAL DAILY
Qty: 10 TABLET | Refills: 0 | Status: SHIPPED | OUTPATIENT
Start: 2017-08-29 | End: 2017-09-03
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
## 2017-08-29 NOTE — MR AVS SNAPSHOT
After Visit Summary   8/29/2017    Jose New    MRN: [id number]           Patient Information     Date Of Birth          1955        Visit Information        Provider Department      8/29/2017 8:00 AM Diogenes Whaley MD [facility]        Today's Diagnoses     Degenerative disc disease, cervical    -  1    Degenerative disc disease, lumbar        Cervical radicular pain        Lumbar radicular pain           Follow-ups after your visit        Your next 10 appointments already scheduled     Sep 07, 2017  5:00 PM CDT   MR CERVICAL SPINE W/O CONTRAST with MGMR1   [facility]    [address] [address] [place] [address] [phone]           Take your medicines as usual, unless your doctor tells you not to. Bring a list of your current medicines to your exam (including vitamins, minerals and over-the-counter drugs). Also bring the results of similar scans you may have had.  Please remove any body piercings and hair extensions before you arrive.  Follow your doctor s orders. If you do not, we may have to postpone your exam.  You will not have contrast for this exam. You do not need to do anything special to prepare.  The MRI machine uses a strong magnet. Please wear clothes without metal (snaps, zippers). A sweatsuit works well, or we may give you a hospital gown.   **IMPORTANT** THE INSTRUCTIONS BELOW ARE ONLY FOR THOSE PATIENTS WHO HAVE BEEN TOLD THEY WILL RECEIVE SEDATION OR GENERAL ANESTHESIA DURING THEIR MRI PROCEDURE:  IF YOU WILL RECEIVE SEDATION (take medicine to help you relax during your exam):   You must get the medicine from your doctor before you arrive. Bring the medicine to the exam. Do not take it at home.   Arrive one hour early. Bring someone who can take you home after the test. Your medicine will make you sleepy. After the exam, you may not drive, take a bus or take a taxi by yourself.   No  eating 8 hours before your exam. You may have clear liquids up until 4 hours before your exam. (Clear liquids include water, clear tea, black coffee and fruit juice without pulp.)  IF YOU WILL RECEIVE ANESTHESIA (be asleep for your exam):   Arrive 1 1/2 hours early. Bring someone who can take you home after the test. You may not drive, take a bus or take a taxi by yourself.   No eating 8 hours before your exam. You may have clear liquids up until 4 hours before your exam. (Clear liquids include water, clear tea, black coffee and fruit juice without pulp.)   You will spend four to five hours in the recovery room.  Please call the Imaging Department at your exam site with any questions.            Sep 07, 2017  5:45 PM CDT   MR LUMBAR SPINE W/O CONTRAST with MGMR1   [facility] ([facility])    [address] [place] [address] [phone]           Take your medicines as usual, unless your doctor tells you not to. Bring a list of your current medicines to your exam (including vitamins, minerals and over-the-counter drugs). Also bring the results of similar scans you may have had.  Please remove any body piercings and hair extensions before you arrive.  Follow your doctor s orders. If you do not, we may have to postpone your exam.  You will not have contrast for this exam. You do not need to do anything special to prepare.  The MRI machine uses a strong magnet. Please wear clothes without metal (snaps, zippers). A sweatsuit works well, or we may give you a hospital gown.   **IMPORTANT** THE INSTRUCTIONS BELOW ARE ONLY FOR THOSE PATIENTS WHO HAVE BEEN TOLD THEY WILL RECEIVE SEDATION OR GENERAL ANESTHESIA DURING THEIR MRI PROCEDURE:  IF YOU WILL RECEIVE SEDATION (take medicine to help you relax during your exam):   You must get the medicine from your doctor before you arrive. Bring the medicine to the exam. Do not take it at home.   Arrive one hour early. Bring someone  who can take you home after the test. Your medicine will make you sleepy. After the exam, you may not drive, take a bus or take a taxi by yourself.   No eating 8 hours before your exam. You may have clear liquids up until 4 hours before your exam. (Clear liquids include water, clear tea, black coffee and fruit juice without pulp.)  IF YOU WILL RECEIVE ANESTHESIA (be asleep for your exam):   Arrive 1 1/2 hours early. Bring someone who can take you home after the test. You may not drive, take a bus or take a taxi by yourself.   No eating 8 hours before your exam. You may have clear liquids up until 4 hours before your exam. (Clear liquids include water, clear tea, black coffee and fruit juice without pulp.)   You will spend four to five hours in the recovery room.  Please call the Imaging Department at your exam site with any questions.              Future tests that were ordered for you today     Open Future Orders        Priority Expected Expires Ordered    MR Lumbar Spine w/o Contrast Routine  8/29/2018 8/29/2017    MR Cervical Spine w/o Contrast Routine  8/29/2018 8/29/2017            Who to contact     If you have questions or need follow up information about today's clinic visit or your schedule please contact [facility] directly at [phone].  Normal or non-critical lab and imaging results will be communicated to you by Flowonixhart, letter or phone within 4 business days after the clinic has received the results. If you do not hear from us within 7 days, please contact the clinic through Flowonixhart or phone. If you have a critical or abnormal lab result, we will notify you by phone as soon as possible.  Submit refill requests through Vokle or call your pharmacy and they will forward the refill request to us. Please allow 3 business days for your refill to be completed.          Additional Information About Your Visit        Vokle Information     Vokle is an electronic gateway that provides  easy, online access to your medical records. With [employer], you can request a clinic appointment, read your test results, renew a prescription or communicate with your care team.     To sign up for [employer] visit the website at [email or web address]   You will be asked to enter the access code listed below, as well as some personal information. Please follow the directions to create your username and password.     Your access code is: [email or web address]  Expires: 10/8/2017  7:44 PM     Your access code will  in 90 days. If you need help or a new code, please contact your [facility] or call [phone] for assistance.        Care EveryWhere ID     This is your Care EveryWhere ID. This could be used by other organizations to access your [place] medical records  [id number]        Your Vitals Were     Pulse                   79            Blood Pressure from Last 3 Encounters:   17 146/89   17 132/82   17 136/86    Weight from Last 3 Encounters:   17 86.8 kg (191 lb 4.8 oz)   16 87 kg (191 lb 14.4 oz)   16 87.6 kg (193 lb 1 oz)                 Today's Medication Changes          These changes are accurate as of: 17  9:02 AM.  If you have any questions, ask your nurse or doctor.               Start taking these medicines.        Dose/Directions    diclofenac 75 MG EC tablet   Commonly known as:  VOLTAREN   Used for:  Degenerative disc disease, cervical, Degenerative disc disease, lumbar, Cervical radicular pain, Lumbar radicular pain        Dose:  75 mg   Take 1 tablet (75 mg) by mouth 2 times daily as needed   Quantity:  40 tablet   Refills:  1       predniSONE 20 MG tablet   Commonly known as:  DELTASONE   Used for:  Degenerative disc disease, cervical, Degenerative disc disease, lumbar, Cervical radicular pain, Lumbar radicular pain        Dose:  40 mg   Take 2 tablets (40 mg) by mouth daily for 5 days   Quantity:  10 tablet   Refills:  0             Where to get your medicines      These medications were sent to [id number] - [place], [place] - [address]  [address] [facility] [address]     Phone:  [phone]     diclofenac 75 MG EC tablet    predniSONE 20 MG tablet                Primary Care Provider Office Phone # Fax #    Tia Ledesma PA-C [phone]       [address]  [facility] [address]        Equal Access to Services     [facility] : Hadii aad ku hadasho Soomaali, waaxda luqadaha, qaybta kaalmada adeegyada, waxay idiin hayaan adeeg kharash nevin . So [facility] [phone].    ATENCIÓN: Si habla español, tiene a pendleton disposición servicios gratuitos de asistencia lingüística. [facility] [phone].    We comply with applicable federal civil rights laws and [place] laws. We do not discriminate on the basis of race, color, national origin, age, disability sex, sexual orientation or gender identity.            Thank you!     Thank you for choosing [facility]  for your care. Our goal is always to provide you with excellent care. Hearing back from our patients is one way we can continue to improve our services. Please take a few minutes to complete the written survey that you may receive in the mail after your visit with us. Thank you!             Your Updated Medication List - Protect others around you: Learn how to safely use, store and throw away your medicines at www.disposemymeds.org.          This list is accurate as of: 8/29/17  9:02 AM.  Always use your most recent med list.                   Brand Name Dispense Instructions for use Diagnosis    diclofenac 75 MG EC tablet    VOLTAREN    40 tablet    Take 1 tablet (75 mg) by mouth 2 times daily as needed    Degenerative disc disease, cervical, Degenerative disc disease, lumbar, Cervical radicular pain, Lumbar radicular pain       methocarbamol 500 MG tablet    ROBAXIN    120 tablet    Take 2 tablets (1,000 mg)  by mouth 3 times daily as needed for muscle spasms    Cervical strain, subsequent encounter, Left shoulder strain, subsequent encounter, Strain of trapezius muscle, right, subsequent encounter       predniSONE 20 MG tablet    DELTASONE    10 tablet    Take 2 tablets (40 mg) by mouth daily for 5 days    Degenerative disc disease, cervical, Degenerative disc disease, lumbar, Cervical radicular pain, Lumbar radicular pain
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
## 2017-09-01 DIAGNOSIS — S46.811D STRAIN OF TRAPEZIUS MUSCLE, RIGHT, SUBSEQUENT ENCOUNTER: ICD-10-CM

## 2017-09-01 DIAGNOSIS — S46.912D LEFT SHOULDER STRAIN, SUBSEQUENT ENCOUNTER: ICD-10-CM

## 2017-09-01 DIAGNOSIS — S16.1XXD CERVICAL STRAIN, SUBSEQUENT ENCOUNTER: ICD-10-CM

## 2017-09-06 RX ORDER — METHOCARBAMOL 500 MG/1
1000 TABLET, FILM COATED ORAL 3 TIMES DAILY PRN
Qty: 120 TABLET | Refills: 0 | Status: SHIPPED | OUTPATIENT
Start: 2017-09-06 | End: 2018-01-11

## 2017-09-07 ENCOUNTER — RADIANT APPOINTMENT (OUTPATIENT)
Dept: MRI IMAGING | Facility: CLINIC | Age: 62
End: 2017-09-07
Attending: PREVENTIVE MEDICINE
Payer: COMMERCIAL

## 2017-09-07 DIAGNOSIS — M54.16 LUMBAR RADICULAR PAIN: ICD-10-CM

## 2017-09-07 DIAGNOSIS — M51.369 DEGENERATIVE DISC DISEASE, LUMBAR: ICD-10-CM

## 2017-09-07 DIAGNOSIS — M54.12 CERVICAL RADICULAR PAIN: ICD-10-CM

## 2017-09-07 DIAGNOSIS — M50.30 DEGENERATIVE DISC DISEASE, CERVICAL: ICD-10-CM

## 2017-09-07 PROCEDURE — 72148 MRI LUMBAR SPINE W/O DYE: CPT | Performed by: RADIOLOGY

## 2017-09-07 PROCEDURE — 72141 MRI NECK SPINE W/O DYE: CPT | Performed by: RADIOLOGY

## 2017-09-14 ENCOUNTER — TELEPHONE (OUTPATIENT)
Dept: FAMILY MEDICINE | Facility: CLINIC | Age: 62
End: 2017-09-14

## 2017-09-14 NOTE — TELEPHONE ENCOUNTER
Called and spoke with the patient. She notes she was calling to follow up on the MRI results and the plan of care. She notes that she has had issues with muscle spasms. Patient is advised to call Dr Whaley as he ordered the MRI's and see how he would like her to proceed. Patient agrees with the plan and will call back if she is unable to get a hold of Dr Whaley.    Leticia Joy RN, [facility] Triage
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
## 2017-09-20 ENCOUNTER — TELEPHONE (OUTPATIENT)
Dept: ORTHOPEDICS | Facility: CLINIC | Age: 62
End: 2017-09-20

## 2017-09-20 NOTE — TELEPHONE ENCOUNTER
[facility] Call Center    Phone Message    Name of Caller: Jose    Phone Number: Cell number on file:    Telephone Information: [phone]     Best time to return call: any    May a detailed message be left on voicemail: yes    Relation to patient: Self    Reason for Call: Other: Patient is calling to follow up with Dr. Whaley teams to see what her next steps should be after her MRI. Patient states she was told she would a call back [facility] MRI results. Please advise.     Action Taken: Message routed to:  Adult Clinics: Sports Medicine p [phone]

## 2017-09-21 NOTE — TELEPHONE ENCOUNTER
Called and spoke to patient and let her know that Dr. Whaley would prefer seeing patient back in clinic to discuss results.     Patient understood and appointment made for next Wednesday.

## 2017-09-27 ENCOUNTER — OFFICE VISIT (OUTPATIENT)
Dept: ORTHOPEDICS | Facility: CLINIC | Age: 62
End: 2017-09-27
Payer: COMMERCIAL

## 2017-09-27 VITALS — SYSTOLIC BLOOD PRESSURE: 161 MMHG | HEART RATE: 86 BPM | DIASTOLIC BLOOD PRESSURE: 86 MMHG

## 2017-09-27 DIAGNOSIS — M50.20 HERNIATED DISC, CERVICAL: Primary | ICD-10-CM

## 2017-09-27 PROCEDURE — 99213 OFFICE O/P EST LOW 20 MIN: CPT | Performed by: PREVENTIVE MEDICINE

## 2017-09-27 RX ORDER — MELOXICAM 7.5 MG/1
7.5 TABLET ORAL
Qty: 30 TABLET | Refills: 1 | Status: SHIPPED | OUTPATIENT
Start: 2017-09-27 | End: 2018-01-11

## 2017-09-27 ASSESSMENT — PAIN SCALES - GENERAL: PAINLEVEL: SEVERE PAIN (7)

## 2017-09-27 NOTE — PROGRESS NOTES
HISTORY OF PRESENT ILLNESS  Ms. Alexandra New returns to discuss her MRI of her neck and her back. She continues to have pain, but has been using some medications that have been helpful.  Location: neck and low back  Quality:  achy pain    Severity: 8/10 at worst    Duration: since April  Timing: occurs daily    Modifying factors:  resting and non-use makes it better, movement and use makes it worse  Associated signs & symptoms: radiation of pain into shoulders from her neck, and head, and pain into legs and knees from her low back    Additional history: as documented    MEDICAL HISTORY  Patient Active Problem List   Diagnosis     Obesity (BMI 30.0-34.9)     Neck pain     Bilateral knee pain     Bilateral low back pain without sciatica       Current Outpatient Prescriptions   Medication Sig Dispense Refill     methocarbamol (ROBAXIN) 500 MG tablet Take 2 tablets (1,000 mg) by mouth 3 times daily as needed for muscle spasms 120 tablet 0     diclofenac (VOLTAREN) 75 MG EC tablet Take 1 tablet (75 mg) by mouth 2 times daily as needed 40 tablet 1       No Known Allergies    No family history on file.    Additional medical/Social/Surgical histories reviewed in [facility] and updated as appropriate.     REVIEW OF SYSTEMS (10/3/2017)  10 point ROS of systems including Constitutional, Eyes, Respiratory, Cardiovascular, Gastroenterology, Genitourinary, Integumentary, Musculoskeletal, Psychiatric were all negative except for pertinent positives noted in my HPI.     PHYSICAL EXAM  Vitals:    09/27/17 1641   BP: 161/86   Pulse: 86     Vital Signs: /86  Pulse 86 Patient declined being weighed. There is no height or weight on file to calculate BMI.    General  - normal appearance, in no obvious distress  CV  - normal peripheral perfusion  Pulm  - normal respiratory pattern, non-labored  Musculoskeletal - lumbar spine and cervical spine  - stance: normal gait without limp, no obvious leg length discrepancy, normal heel and toe  walk  - inspection: normal bone and joint alignment, no obvious scoliosis  - palpation: no paravertebral or bony tenderness  - ROM: flexion exacerbates pain, normal extension, sidebending, rotation  - strength: lower extremities 5/5 in all planes  - special tests:  (+) straight leg raise  (+) slump test  Cervical spine: positive spurlings bilaterally, pain into bilateral trapezius'  Overall unchanged from previous visits  Neuro  - patellar and Achilles DTRs 2+ bilaterally, lower extremity sensory deficit throughout L5 distribution, grossly normal coordination, normal muscle tone  Skin  - no ecchymosis, erythema, warmth, or induration, no obvious rash  Psych  - interactive, appropriate, normal mood and affect    ASSESSMENT & PLAN  63 yo female with neck and low back pain due to lumbar and cervical disc radicular pain not resolved  Reviewed cervical and lumbar mri's which show ddd    Cont. Robaxin at  Nighttime  Cont. HEP  start pool therapy as previously discussed  Patient will consider ALBERT for both neck and back, but doesn't want to decide at this time.   SHE WILL CALL US WHEN SHE WANTS TO DO THIS      Diogenes Whaley MD, CAM

## 2017-09-27 NOTE — MR AVS SNAPSHOT
After Visit Summary   2017    Jose New    MRN: [id number]           Patient Information     Date Of Birth          1955        Visit Information        Provider Department      2017 4:40 PM Diogenes Whaley MD [facility]        Today's Diagnoses     Herniated disc, cervical    -  1      Care Instructions    Thanks for coming today.  Ortho/Sports Medicine Clinic  [address] [place] [address]    To schedule future appointments in Ortho Clinic, you may call [phone].    To schedule ordered imaging by your provider:   Call Central Imaging [phone]    To schedule an injection ordered by your provider:  Call Central Imaging Injection scheduling line: [phone]  MyChart available online at:  [email or web address]    Please call if any further questions or concerns ([phone]).  Clinic hours 8 am to 5 pm.    Return to clinic (call) if symptoms worsen or fail to improve.          Follow-ups after your visit        Your next 10 appointments already scheduled     Oct 04, 2017  5:00 PM CDT   [facility]    [address] [place] [address] [phone]           Bring a current list of meds and any records pertaining to this visit. For Physicals, please bring immunization records and any forms needing to be filled out. Please arrive 10 minutes early to complete paperwork.              Who to contact     If you have questions or need follow up information about today's clinic visit or your schedule please contact [facility] directly at [phone].  Normal or non-critical lab and imaging results will be communicated to you by MyChart, letter or phone within 4 business days after the clinic has received the results. If you do not hear from us within 7 days, please contact the clinic through  [employer] or phone. If you have a critical or abnormal lab result, we will notify you by phone as soon as possible.  Submit refill requests through [employer] or call your pharmacy and they will forward the refill request to us. Please allow 3 business days for your refill to be completed.          Additional Information About Your Visit        [employer] Information     [employer] is an electronic gateway that provides easy, online access to your medical records. With [employer], you can request a clinic appointment, read your test results, renew a prescription or communicate with your care team.     To sign up for [employer] visit the website at [email or web address]   You will be asked to enter the access code listed below, as well as some personal information. Please follow the directions to create your username and password.     Your access code is: [email or web address]  Expires: 10/8/2017  7:44 PM     Your access code will  in 90 days. If you need help or a new code, please contact your [facility] or call [phone] for assistance.        Care EveryWhere ID     This is your Care EveryWhere ID. This could be used by other organizations to access your [place] medical records  [id number]        Your Vitals Were     Pulse                   86            Blood Pressure from Last 3 Encounters:   17 161/86   17 146/89   17 132/82    Weight from Last 3 Encounters:   17 86.8 kg (191 lb 4.8 oz)   16 87 kg (191 lb 14.4 oz)   16 87.6 kg (193 lb 1 oz)              Today, you had the following     No orders found for display         Today's Medication Changes          These changes are accurate as of: 17 11:59 PM.  If you have any questions, ask your nurse or doctor.               Start taking these medicines.        Dose/Directions    meloxicam 7.5 MG tablet   Commonly known as:  MOBIC   Used for:  Herniated disc, cervical        Dose:  7.5 mg   Take 1 tablet  (7.5 mg) by mouth 2 times daily   Quantity:  30 tablet   Refills:  1            Where to get your medicines      These medications were sent to [facility] - [place], [place] - [address]  [address] [facility] [address]     Phone:  [phone]     meloxicam 7.5 MG tablet                Primary Care Provider Office Phone # Fax #    Tia Ledesma PA-C [phone] [address]  [facility] [address]        Equal Access to Services     [facility]: Hadii aad ku hadasho Soomaali, waaxda luqadaha, qaybta kaalmada adeegyada, waxay idiin hayaan roberto rooney . So [facility] [phone].    ATENCIÓN: Si habla español, tiene a pendleton disposición servicios gratuitos de asistencia lingüística. [facility] [phone].    We comply with applicable federal civil rights laws and [place] laws. We do not discriminate on the basis of race, color, national origin, age, disability, sex, sexual orientation, or gender identity.            Thank you!     Thank you for choosing [facility]  for your care. Our goal is always to provide you with excellent care. Hearing back from our patients is one way we can continue to improve our services. Please take a few minutes to complete the written survey that you may receive in the mail after your visit with us. Thank you!             Your Updated Medication List - Protect others around you: Learn how to safely use, store and throw away your medicines at www.disposemymeds.org.          This list is accurate as of: 9/27/17 11:59 PM.  Always use your most recent med list.                   Brand Name Dispense Instructions for use Diagnosis    diclofenac 75 MG EC tablet    VOLTAREN    40 tablet    Take 1 tablet (75 mg) by mouth 2 times daily as needed    Degenerative disc disease, cervical, Degenerative disc disease, lumbar, Cervical radicular pain, Lumbar radicular pain       meloxicam 7.5 MG tablet    MOBIC    30  tablet    Take 1 tablet (7.5 mg) by mouth 2 times daily    Herniated disc, cervical       methocarbamol 500 MG tablet    ROBAXIN    120 tablet    Take 2 tablets (1,000 mg) by mouth 3 times daily as needed for muscle spasms    Cervical strain, subsequent encounter, Left shoulder strain, subsequent encounter, Strain of trapezius muscle, right, subsequent encounter

## 2017-09-27 NOTE — PATIENT INSTRUCTIONS
Thanks for coming today.  Ortho/Sports Medicine Clinic  [address] [place] [address]    To schedule future appointments in Ortho Clinic, you may call [phone].    To schedule ordered imaging by your provider:   Call Central Imaging [phone]    To schedule an injection ordered by your provider:  Call Central Imaging Injection scheduling line: [phone]  [employer] available online at:  [email or web address]    Please call if any further questions or concerns ([phone]).  Clinic hours 8 am to 5 pm.    Return to clinic (call) if symptoms worsen or fail to improve.

## 2017-09-29 PROBLEM — M25.562 BILATERAL KNEE PAIN: Status: RESOLVED | Noted: 2017-06-02 | Resolved: 2017-09-29

## 2017-09-29 PROBLEM — M54.2 NECK PAIN: Status: RESOLVED | Noted: 2017-04-13 | Resolved: 2017-09-29

## 2017-09-29 PROBLEM — M54.50 BILATERAL LOW BACK PAIN WITHOUT SCIATICA: Status: RESOLVED | Noted: 2017-06-02 | Resolved: 2017-09-29

## 2017-09-29 PROBLEM — M25.561 BILATERAL KNEE PAIN: Status: RESOLVED | Noted: 2017-06-02 | Resolved: 2017-09-29

## 2017-10-04 ENCOUNTER — OFFICE VISIT (OUTPATIENT)
Dept: FAMILY MEDICINE | Facility: CLINIC | Age: 62
End: 2017-10-04
Payer: COMMERCIAL

## 2017-10-04 VITALS
HEART RATE: 78 BPM | OXYGEN SATURATION: 100 % | SYSTOLIC BLOOD PRESSURE: 136 MMHG | TEMPERATURE: 98.4 F | DIASTOLIC BLOOD PRESSURE: 80 MMHG | RESPIRATION RATE: 18 BRPM

## 2017-10-04 DIAGNOSIS — G89.29 CHRONIC BILATERAL LOW BACK PAIN WITH SCIATICA, SCIATICA LATERALITY UNSPECIFIED: ICD-10-CM

## 2017-10-04 DIAGNOSIS — M54.40 CHRONIC BILATERAL LOW BACK PAIN WITH SCIATICA, SCIATICA LATERALITY UNSPECIFIED: ICD-10-CM

## 2017-10-04 DIAGNOSIS — M50.20 HERNIATED DISC, CERVICAL: Primary | ICD-10-CM

## 2017-10-04 DIAGNOSIS — Z12.31 VISIT FOR SCREENING MAMMOGRAM: ICD-10-CM

## 2017-10-04 DIAGNOSIS — Z12.11 SCREENING FOR COLON CANCER: ICD-10-CM

## 2017-10-04 DIAGNOSIS — G89.29 CHRONIC PAIN OF LEFT KNEE: ICD-10-CM

## 2017-10-04 DIAGNOSIS — G89.29 CHRONIC PAIN OF RIGHT KNEE: ICD-10-CM

## 2017-10-04 DIAGNOSIS — M25.562 CHRONIC PAIN OF LEFT KNEE: ICD-10-CM

## 2017-10-04 DIAGNOSIS — M25.561 CHRONIC PAIN OF RIGHT KNEE: ICD-10-CM

## 2017-10-04 PROCEDURE — 99213 OFFICE O/P EST LOW 20 MIN: CPT | Performed by: PHYSICIAN ASSISTANT

## 2017-10-04 ASSESSMENT — PAIN SCALES - GENERAL: PAINLEVEL: SEVERE PAIN (6)

## 2017-10-04 NOTE — MR AVS SNAPSHOT
After Visit Summary   10/4/2017    Jose New    MRN: [id number]           Patient Information     Date Of Birth          1955        Visit Information        Provider Department      10/4/2017 5:00 PM Tia Ledesma PA-C [facility]        Today's Diagnoses     Herniated disc, cervical    -  1    Chronic bilateral low back pain with sciatica, sciatica laterality unspecified        Chronic pain of left knee        Chronic pain of right knee        Visit for screening mammogram          Care Instructions    Schedule physical therapy with [facility] ([phone]).  They have several locations around the [facility].      I will notify you once I have heard from Dr. Whaley where they recommend pool therapy.     Let us know if you decide to proceed with steroid injections.     Schedule mammogram at [facility] ([phone]).    Bring in FIT card for colon cancer screening.     Please schedule physical with fasting labs (nothing to eat or drink except water and/or medications 12 hours prior to appointment).             Follow-ups after your visit        Additional Services     [facility] PT, HAND, AND CHIROPRACTIC REFERRAL       === This order will print in the [facility] Scheduling  Office ===    Physical therapy, hand therapy and chiropractic care are available through:    [facility]  [facility]    Call one easy number to schedule at any of the above locations:  [phone].    Your provider has referred you to Physical Therapy at [facility] or [facility]    Indication/Reason for Referral: Low Back Pain and neck pain and bilateral knee pain   Onset of Illness:  4/4/17  Therapy Orders:  Evaluate and Treat  Special Programs:  None  Special Request:  None    Additional Comments for the therapist or chiropractor:    [facility] - wants physical therapy - has declined epidural  "injections.     Please be aware that coverage of these services is subject to the terms and limitations of your health insurance plan.  Call member services at your health plan with any benefit or coverage questions.      Please bring the following to your appointment:    >>   Your personal calendar for scheduling future appointments  >>   Comfortable clothing                  Future tests that were ordered for you today     Open Future Orders        Priority Expected Expires Ordered    *MA Screening Digital Bilateral Routine  10/4/2018 10/4/2017            Who to contact     If you have questions or need follow up information about today's clinic visit or your schedule please contact [facility] directly at [phone].  Normal or non-critical lab and imaging results will be communicated to you by AccelGolfhart, letter or phone within 4 business days after the clinic has received the results. If you do not hear from us within 7 days, please contact the clinic through AccelGolfhart or phone. If you have a critical or abnormal lab result, we will notify you by phone as soon as possible.  Submit refill requests through SPO or call your pharmacy and they will forward the refill request to us. Please allow 3 business days for your refill to be completed.          Additional Information About Your Visit        MyChart Information     SPO lets you send messages to your doctor, view your test results, renew your prescriptions, schedule appointments and more. To sign up, go to [email or web address] . Click on \"Log in\" on the left side of the screen, which will take you to the Welcome page. Then click on \"Sign up Now\" on the right side of the page.     You will be asked to enter the access code listed below, as well as some personal information. Please follow the directions to create your username and password.     Your access code is: [email or web address]  Expires: 10/8/2017  7:44 PM     Your access code will  " in 90 days. If you need help or a new code, please call your [facility] clinic or [phone].        Care EveryWhere ID     This is your Care EveryWhere ID. This could be used by other organizations to access your [facility] medical records  [id number]        Your Vitals Were     Pulse Temperature Respirations Pulse Oximetry Breastfeeding?       78 98.4  F (36.9  C) (Oral) 18 100% No        Blood Pressure from Last 3 Encounters:   10/04/17 136/80   09/27/17 161/86   08/29/17 146/89    Weight from Last 3 Encounters:   04/07/17 86.8 kg (191 lb 4.8 oz)   04/08/16 87 kg (191 lb 14.4 oz)   02/29/16 87.6 kg (193 lb 1 oz)              We Performed the Following     [facility] PT, HAND, AND CHIROPRACTIC REFERRAL        Primary Care Provider Office Phone # Fax #    Tia Ledesma PA-C [phone] [address]  [facility] [address]        Equal Access to Services     [facility]: Hadii aad ku hadasho Soomaali, waaxda luqadaha, qaybta kaalmada adeegyada, waxay octavio hayceleste rooney . So [facility] [phone].    ATENCIÓN: Si habla español, tiene a pendleton disposición servicios gratuitos de asistencia lingüística. Llame al [phone].    We comply with applicable federal civil rights laws and [place] laws. We do not discriminate on the basis of race, color, national origin, age, disability, sex, sexual orientation, or gender identity.            Thank you!     Thank you for choosing [facility]  for your care. Our goal is always to provide you with excellent care. Hearing back from our patients is one way we can continue to improve our services. Please take a few minutes to complete the written survey that you may receive in the mail after your visit with us. Thank you!             Your Updated Medication List - Protect others around you: Learn how to safely use, store and throw away your medicines at www.disposemymeds.org.          This list is accurate as of: 10/4/17  5:35 PM.   Always use your most recent med list.                   Brand Name Dispense Instructions for use Diagnosis    diclofenac 75 MG EC tablet    VOLTAREN    40 tablet    Take 1 tablet (75 mg) by mouth 2 times daily as needed    Degenerative disc disease, cervical, Degenerative disc disease, lumbar, Cervical radicular pain, Lumbar radicular pain       meloxicam 7.5 MG tablet    MOBIC    30 tablet    Take 1 tablet (7.5 mg) by mouth 2 times daily    Herniated disc, cervical       methocarbamol 500 MG tablet    ROBAXIN    120 tablet    Take 2 tablets (1,000 mg) by mouth 3 times daily as needed for muscle spasms    Cervical strain, subsequent encounter, Left shoulder strain, subsequent encounter, Strain of trapezius muscle, right, subsequent encounter

## 2017-10-04 NOTE — NURSING NOTE
"Chief Complaint   Patient presents with     Results       Initial /80 (BP Location: Right arm, Patient Position: Chair, Cuff Size: Adult Large)  Pulse 78  Temp 98.4  F (36.9  C) (Oral)  Resp 18  SpO2 100%  Breastfeeding? No Estimated body mass index is 31.83 kg/(m^2) as calculated from the following:    Height as of 4/7/17: 1.651 m (5' 5\").    Weight as of 4/7/17: 86.8 kg (191 lb 4.8 oz).  Medication Reconciliation: complete     Rica Alaniz MA       "

## 2017-10-04 NOTE — PATIENT INSTRUCTIONS
Schedule physical therapy with [facility] ([phone]).  They have several locations around the [facility].      I will notify you once I have heard from Dr. Whaley where they recommend pool therapy.     Let us know if you decide to proceed with steroid injections.     Schedule mammogram at [facility] ([phone]).    Bring in FIT card for colon cancer screening.     Please schedule physical with fasting labs (nothing to eat or drink except water and/or medications 12 hours prior to appointment).

## 2017-10-04 NOTE — PROGRESS NOTES
SUBJECTIVE:   Jose New is a 62 year old female who presents to clinic today for the following health issues:      Follow up MRI results    Has seen orthopedics and MRI significant for disc bulge L45 with mild bilateral foraminal narrowing and mild spinal canal stenosis and multilevel degenerative changes along with C3C4 with spinal canal narrowing as well as C4C5 narrowing the right neural formen.  C56 withi mild circumferential disc osteophyte complex mildly narrowing the right neural foramen.   Pain persists and unchanged.  Reports she has decided against steroid injections but would like referral back to physical therapy because did find helpful.  Also wonders about pool therapy.      Problem list and histories reviewed & adjusted, as indicated.  Additional history: as documented    Patient Active Problem List   Diagnosis     Obesity (BMI 30.0-34.9)     History reviewed. No pertinent surgical history.    Social History   Substance Use Topics     Smoking status: Never Smoker     Smokeless tobacco: Never Used     Alcohol use No     History reviewed. No pertinent family history.      Current Outpatient Prescriptions   Medication Sig Dispense Refill     meloxicam (MOBIC) 7.5 MG tablet Take 1 tablet (7.5 mg) by mouth 2 times daily 30 tablet 1     methocarbamol (ROBAXIN) 500 MG tablet Take 2 tablets (1,000 mg) by mouth 3 times daily as needed for muscle spasms 120 tablet 0     diclofenac (VOLTAREN) 75 MG EC tablet Take 1 tablet (75 mg) by mouth 2 times daily as needed 40 tablet 1         Reviewed and updated as needed this visit by clinical staff       Reviewed and updated as needed this visit by Provider         ROS:  Constitutional, HEENT, cardiovascular, pulmonary, gi and gu systems are negative, except as otherwise noted.      OBJECTIVE:   /80 (BP Location: Right arm, Patient Position: Chair, Cuff Size: Adult Large)  Pulse 78  Temp 98.4  F (36.9  C) (Oral)  Resp 18  SpO2 100%   Breastfeeding? No  There is no height or weight on file to calculate BMI.  GENERAL: healthy, alert and no distress  Exam limited to discussion today-reviewing notes from ortho and their recommendations    Diagnostic Test Results:  none     ASSESSMENT/PLAN:             1. Herniated disc, cervical  Referred to physical therapy per request- [facility] PT, HAND, AND CHIROPRACTIC REFERRAL    2. Chronic bilateral low back pain with sciatica, sciatica laterality unspecified  Referred to physical therapy per request  - [facility] PT, HAND, AND CHIROPRACTIC REFERRAL    3. Chronic pain of left knee    - [facility] PT, HAND, AND CHIROPRACTIC REFERRAL    4. Chronic pain of right knee    - [facility] PT, HAND, AND CHIROPRACTIC REFERRAL    5. Visit for screening mammogram    - *MA Screening Digital Bilateral; Future    6. Screening for colon cancer    - Fecal colorectal cancer screen FIT; Future    Patient Instructions   Schedule physical therapy with [facility] ([phone]).  They have several locations around the [facility].      I will notify you once I have heard from Dr. Whaley where they recommend pool therapy.     Let us know if you decide to proceed with steroid injections.     Schedule mammogram at [facility] ([phone]).    Bring in FIT card for colon cancer screening.     Please schedule physical with fasting labs (nothing to eat or drink except water and/or medications 12 hours prior to appointment).          Tia Ledesma PA-C  [facility]

## 2017-10-13 ENCOUNTER — THERAPY VISIT (OUTPATIENT)
Dept: PHYSICAL THERAPY | Facility: CLINIC | Age: 62
End: 2017-10-13
Payer: COMMERCIAL

## 2017-10-13 DIAGNOSIS — M54.50 ACUTE BILATERAL LOW BACK PAIN WITHOUT SCIATICA: ICD-10-CM

## 2017-10-13 DIAGNOSIS — M25.562 ACUTE PAIN OF BOTH KNEES: Primary | ICD-10-CM

## 2017-10-13 DIAGNOSIS — M25.561 ACUTE PAIN OF BOTH KNEES: Primary | ICD-10-CM

## 2017-10-13 DIAGNOSIS — M54.2 NECK PAIN: ICD-10-CM

## 2017-10-13 PROCEDURE — 97164 PT RE-EVAL EST PLAN CARE: CPT | Mod: GP | Performed by: PHYSICAL THERAPIST

## 2017-10-13 PROCEDURE — 97530 THERAPEUTIC ACTIVITIES: CPT | Mod: GP | Performed by: PHYSICAL THERAPIST

## 2017-10-13 PROCEDURE — 97112 NEUROMUSCULAR REEDUCATION: CPT | Mod: GP | Performed by: PHYSICAL THERAPIST

## 2017-10-13 PROCEDURE — 97110 THERAPEUTIC EXERCISES: CPT | Mod: GP | Performed by: PHYSICAL THERAPIST

## 2017-10-13 NOTE — PROGRESS NOTES
"Subjective:    HPI                    Objective:    System    Physical Exam    General     ROS    Assessment/Plan:      PROGRESS  REPORT    Progress reporting period is from 8/11/17 to 10/13/17.       SUBJECTIVE  Subjective changes noted by patient:  Pt returns to PT after two months. Has seen Dr. Whaley and Dr. Ledesma again, and had MRI on both neck and low back. Told she has a pinched nerve in her low back which is causing the back and leg pain. The neck also pinched nerve (EPIC states at L4-5 where there is grade 1 anterolisthesis, disc bulge with right, cervical multilevel DJD). Epidural injections suggested as possibility, water therapy recommended as well.  Pt had conversation with primary care MD (Callie) and Aleja reports that she is concerned about there not being a guarantee for pain relief and concern for paralylsis. Reports that she has tried to do exercises but has is \"all knotted up\" from not having come to therapy. Does not like some of the medication that has been rx for managing her pain. Has been given rx from MD to allow more often standing at work () and has gotten a standing desk.  Today describes significant pain in B upper back/shoulders/neck.   Current pain level is 6/10  .     Previous pain level was  6/10  .   Changes in function:  Yes (See Goal flowsheet attached for changes in current functional level)  Adverse reaction to treatment or activity: None    OBJECTIVE  Changes noted in objective findings:  Yes, See physical exam section and/or daily flowsheet for response to repeated movements.         ASSESSMENT/PLAN  Updated problem list and treatment plan: Diagnosis 1:  Neck pain/WAD  Pain -  mechanical traction, manual therapy, self management, education, directional preference exercise and home program  Decreased ROM/flexibility - manual therapy, therapeutic exercise, therapeutic activity and home program  Decreased joint mobility - manual therapy, therapeutic " exercise, therapeutic activity and home program  Inflammation - self management/home program  Impaired muscle performance - neuro re-education and home program  Decreased function - therapeutic activities and home program  Impaired posture - neuro re-education, therapeutic activities and home program  Diagnosis 2:  LBP/WAD   Pain -  manual therapy, self management, education, directional preference exercise and home program  Decreased ROM/flexibility - manual therapy, therapeutic exercise, therapeutic activity and home program  Decreased joint mobility - manual therapy, therapeutic exercise, therapeutic activity and home program  Inflammation - self management/home program  Decreased function - therapeutic activities and home program  Impaired posture - neuro re-education, therapeutic activities and home program  Diagnosis 3:  B knee pain  Pain -  self management, education, directional preference exercise and home program  Decreased ROM/flexibility - manual therapy, therapeutic exercise, therapeutic activity and home program  Inflammation - self management/home program  Impaired gait - gait training and home program  Decreased function - therapeutic activities and home program   STG/LTGs have been met or progress has been made towards goals:  Yes (See Goal flow sheet completed today.)  Assessment of Progress: The patient's condition has exacerbated.  Self Management Plans:  Patient has been instructed in a home treatment program.  Patient  has been instructed in self management of symptoms.  I have re-evaluated this patient and find that the nature, scope, duration and intensity of the therapy is appropriate for the medical condition of the patient.  Jose continues to require the following intervention to meet STG and LTG's:  PT    Recommendations:  This patient would benefit from continued therapy.     Frequency:  1 X week, once daily  Duration:  for 6 weeks          Please refer to the daily flowsheet for  treatment today, total treatment time and time spent performing 1:1 timed codes.

## 2017-10-13 NOTE — MR AVS SNAPSHOT
After Visit Summary   10/13/2017    Jose New    MRN: [id number]           Patient Information     Date Of Birth          1955        Visit Information        Provider Department      10/13/2017 4:30 PM Reggie Camargo, PT [facility] Physical Therapy        Today's Diagnoses     Acute pain of both knees    -  1    Acute bilateral low back pain without sciatica        Neck pain           Follow-ups after your visit        Your next 10 appointments already scheduled     Oct 19, 2017  5:00 PM CDT   [facility] Spine with Reggie Camargo, PT   [facility] ([facility])    [address]  [facility] [place] [address]   [phone]            Oct 26, 2017  5:00 PM CDT   [facility] Spine with Reggie Camargo, PT   [facility] ([facility])    [address]  [place] [address]   [phone]            Oct 31, 2017  5:20 PM CDT   [facility] Spine with Reggie Camargo, PT   [facility] ([facility])    [address]  [facility] [place] [address]   [phone]            Nov 09, 2017  5:00 PM CST   [facility] Spine with Reggie Camargo, PT   [facility] ([facility])    [address]  [place] [address]   [phone]              Who to contact     If you have questions or need follow up information about today's clinic visit or your schedule please contact [facility] PHYSICAL [facility]ERAP[facility] directly at [phone].  Normal or non-critical lab and imaging results will be communicated to you by MyChart, letter or phone within 4 business days after the clinic has received the results. If you do not hear from us within 7 days, please contact the clinic through MyChart or phone. If  "you have a critical or abnormal lab result, we will notify you by phone as soon as possible.  Submit refill requests through Icera or call your pharmacy and they will forward the refill request to us. Please allow 3 business days for your refill to be completed.          Additional Information About Your Visit        EVIIVOhart Information     Icera lets you send messages to your doctor, view your test results, renew your prescriptions, schedule appointments and more. To sign up, go to [email or web address] . Click on \"Log in\" on the left side of the screen, which will take you to the Welcome page. Then click on \"Sign up Now\" on the right side of the page.     You will be asked to enter the access code listed below, as well as some personal information. Please follow the directions to create your username and password.     Your access code is: [email or web address]  Expires: 2018  5:45 PM     Your access code will  in 90 days. If you need help or a new code, please call your [facility] clinic or [phone].        Care EveryWhere ID     This is your Care EveryWhere ID. This could be used by other organizations to access your [facility] medical records  [id number]         Blood Pressure from Last 3 Encounters:   10/04/17 136/80   17 161/86   17 146/89    Weight from Last 3 Encounters:   17 86.8 kg (191 lb 4.8 oz)   16 87 kg (191 lb 14.4 oz)   16 87.6 kg (193 lb 1 oz)              We Performed the Following     [facility] PROGRESS NOTES REPORT     NEUROMUSCULAR RE-EDUCATION     PT Re-Eval ([id number])     THERAPEUTIC ACTIVITIES     THERAPEUTIC EXERCISES        Primary Care Provider Office Phone # Fax #    Tia Ledesma PA-C [phone] [address]  [facility] [address]        Equal Access to Services     [facility]: Hadii aad ku hadasho Soomaali, waaxda luqadaha, qaybta kaalmada adeegyaesther, paul angela. So [facility] " [phone].    ATENCIÓN: Si john bran, tiene a pendleton disposición servicios gratuitos de asistencia lingüística. Albertina wilks [phone].    We comply with applicable federal civil rights laws and [place] laws. We do not discriminate on the basis of race, color, national origin, age, disability, sex, sexual orientation, or gender identity.            Thank you!     Thank you for choosing [facility]  for your care. Our goal is always to provide you with excellent care. Hearing back from our patients is one way we can continue to improve our services. Please take a few minutes to complete the written survey that you may receive in the mail after your visit with us. Thank you!             Your Updated Medication List - Protect others around you: Learn how to safely use, store and throw away your medicines at www.disposemymeds.org.          This list is accurate as of: 10/13/17  5:45 PM.  Always use your most recent med list.                   Brand Name Dispense Instructions for use Diagnosis    diclofenac 75 MG EC tablet    VOLTAREN    40 tablet    Take 1 tablet (75 mg) by mouth 2 times daily as needed    Degenerative disc disease, cervical, Degenerative disc disease, lumbar, Cervical radicular pain, Lumbar radicular pain       meloxicam 7.5 MG tablet    MOBIC    30 tablet    Take 1 tablet (7.5 mg) by mouth 2 times daily    Herniated disc, cervical       methocarbamol 500 MG tablet    ROBAXIN    120 tablet    Take 2 tablets (1,000 mg) by mouth 3 times daily as needed for muscle spasms    Cervical strain, subsequent encounter, Left shoulder strain, subsequent encounter, Strain of trapezius muscle, right, subsequent encounter

## 2017-10-16 ENCOUNTER — TELEPHONE (OUTPATIENT)
Dept: FAMILY MEDICINE | Facility: CLINIC | Age: 62
End: 2017-10-16

## 2017-10-16 DIAGNOSIS — M54.42 CHRONIC BILATERAL LOW BACK PAIN WITH BILATERAL SCIATICA: ICD-10-CM

## 2017-10-16 DIAGNOSIS — M54.41 CHRONIC BILATERAL LOW BACK PAIN WITH BILATERAL SCIATICA: ICD-10-CM

## 2017-10-16 DIAGNOSIS — M50.20 HERNIATED DISC, CERVICAL: Primary | ICD-10-CM

## 2017-10-16 DIAGNOSIS — G89.29 CHRONIC BILATERAL LOW BACK PAIN WITH BILATERAL SCIATICA: ICD-10-CM

## 2017-10-16 NOTE — TELEPHONE ENCOUNTER
Please call and advise that we usually refer to [facility] for pool therapy  Check with insurance regarding coverage.

## 2017-10-19 ENCOUNTER — TELEPHONE (OUTPATIENT)
Dept: FAMILY MEDICINE | Facility: CLINIC | Age: 62
End: 2017-10-19

## 2017-10-19 ENCOUNTER — THERAPY VISIT (OUTPATIENT)
Dept: PHYSICAL THERAPY | Facility: CLINIC | Age: 62
End: 2017-10-19
Payer: COMMERCIAL

## 2017-10-19 DIAGNOSIS — M25.562 ACUTE PAIN OF BOTH KNEES: Primary | ICD-10-CM

## 2017-10-19 DIAGNOSIS — M25.561 ACUTE PAIN OF BOTH KNEES: Primary | ICD-10-CM

## 2017-10-19 DIAGNOSIS — M54.41 CHRONIC BILATERAL LOW BACK PAIN WITH BILATERAL SCIATICA: ICD-10-CM

## 2017-10-19 DIAGNOSIS — M54.50 ACUTE BILATERAL LOW BACK PAIN WITHOUT SCIATICA: ICD-10-CM

## 2017-10-19 DIAGNOSIS — M54.42 CHRONIC BILATERAL LOW BACK PAIN WITH BILATERAL SCIATICA: ICD-10-CM

## 2017-10-19 DIAGNOSIS — M54.2 NECK PAIN: ICD-10-CM

## 2017-10-19 DIAGNOSIS — M50.20 HERNIATED DISC, CERVICAL: Primary | ICD-10-CM

## 2017-10-19 DIAGNOSIS — G89.29 CHRONIC BILATERAL LOW BACK PAIN WITH BILATERAL SCIATICA: ICD-10-CM

## 2017-10-19 PROCEDURE — 97112 NEUROMUSCULAR REEDUCATION: CPT | Mod: GP | Performed by: PHYSICAL THERAPIST

## 2017-10-19 PROCEDURE — 97110 THERAPEUTIC EXERCISES: CPT | Mod: GP | Performed by: PHYSICAL THERAPIST

## 2017-10-19 PROCEDURE — 97140 MANUAL THERAPY 1/> REGIONS: CPT | Mod: GP | Performed by: PHYSICAL THERAPIST

## 2017-10-19 NOTE — TELEPHONE ENCOUNTER
Reason for Call:  Other     Detailed comments: has water therapy been scheduled - where and when?    Phone Number Patient can be reached at: Work number on file:  [phone] (work)    Best Time: any    Can we leave a detailed message on this number? YES    Call taken on 10/19/2017 at 11:10 AM by Jordyn Hernandez

## 2017-10-19 NOTE — TELEPHONE ENCOUNTER
Patient asking if you have hear from back on pool therapy? Per visit note:  will notify you once I have heard from Dr. Whaley where they recommend pool therapy.  Routing to provider to review and advise.  Sahara Mayers RN.

## 2017-10-19 NOTE — MR AVS SNAPSHOT
After Visit Summary   10/19/2017    Jose New    MRN: [id number]           Patient Information     Date Of Birth          1955        Visit Information        Provider Department      10/19/2017 5:00 PM Reggie Camargo, PT [facility]        Today's Diagnoses     Acute pain of both knees    -  1    Acute bilateral low back pain without sciatica        Neck pain           Follow-ups after your visit        Your next 10 appointments already scheduled     Oct 26, 2017  5:00 PM CDT   GAEL Spine with Reggie Camargo, PT   [facility] ([facility])    [address]  [facility] [address]   [phone]            Oct 31, 2017  5:20 PM CDT   GAEL Spine with Reggie Camargo, PT   [facility] ([facility])    [address]  [facility] [address]   [phone]            Nov 09, 2017  5:00 PM CST   GAEL Spine with Reggie Camargo, PT   [facility] ([facility])    [address]  [facility] [address]   [phone]              Who to contact     If you have questions or need follow up information about today's clinic visit or your schedule please contact [facility] directly at [phone].  Normal or non-critical lab and imaging results will be communicated to you by MyChart, letter or phone within 4 business days after the clinic has received the results. If you do not hear from us within 7 days, please contact the clinic through [employer]Shart or phone. If you have a critical or abnormal lab result, we will notify you by phone as soon as possible.  Submit refill requests through Bigvest or call your pharmacy and they will forward the refill request to us. Please allow 3 business  "days for your refill to be completed.          Additional Information About Your Visit        MyChart Information     GigDropper lets you send messages to your doctor, view your test results, renew your prescriptions, schedule appointments and more. To sign up, go to [email or web address] . Click on \"Log in\" on the left side of the screen, which will take you to the Welcome page. Then click on \"Sign up Now\" on the right side of the page.     You will be asked to enter the access code listed below, as well as some personal information. Please follow the directions to create your username and password.     Your access code is: [email or web address]  Expires: 2018  5:45 PM     Your access code will  in 90 days. If you need help or a new code, please call your [place] clinic or [phone].        Care EveryWhere ID     This is your Care EveryWhere ID. This could be used by other organizations to access your [place] medical records  [id number]         Blood Pressure from Last 3 Encounters:   10/04/17 136/80   17 161/86   17 146/89    Weight from Last 3 Encounters:   17 86.8 kg (191 lb 4.8 oz)   16 87 kg (191 lb 14.4 oz)   16 87.6 kg (193 lb 1 oz)              We Performed the Following     MANUAL THER TECH,1+REGIONS,EA 15 MIN     NEUROMUSCULAR RE-EDUCATION     THERAPEUTIC EXERCISES        Primary Care Provider Office Phone # Fax #    Tia Ledesma PA-C [phone] [address] [facility] [address]        Equal Access to Services     [facility]: Hadii madhu vasquez Soliyah, waaxda luqadaha, qaybta kaalmapaul swift. So [facility] [phone].    ATENCIÓN: Si habla español, tiene a pendleton disposición servicios gratuitos de asistencia lingüística. Albertina al [phone].    We comply with applicable federal civil rights laws and [place] laws. We do not discriminate on the basis of race, color, national origin, " age, disability, sex, sexual orientation, or gender identity.            Thank you!     Thank you for choosing [facility] THER[facility]  for your care. Our goal is always to provide you with excellent care. Hearing back from our patients is one way we can continue to improve our services. Please take a few minutes to complete the written survey that you may receive in the mail after your visit with us. Thank you!             Your Updated Medication List - Protect others around you: Learn how to safely use, store and throw away your medicines at www.disposemymeds.org.          This list is accurate as of: 10/19/17  5:51 PM.  Always use your most recent med list.                   Brand Name Dispense Instructions for use Diagnosis    diclofenac 75 MG EC tablet    VOLTAREN    40 tablet    Take 1 tablet (75 mg) by mouth 2 times daily as needed    Degenerative disc disease, cervical, Degenerative disc disease, lumbar, Cervical radicular pain, Lumbar radicular pain       meloxicam 7.5 MG tablet    MOBIC    30 tablet    Take 1 tablet (7.5 mg) by mouth 2 times daily    Herniated disc, cervical       methocarbamol 500 MG tablet    ROBAXIN    120 tablet    Take 2 tablets (1,000 mg) by mouth 3 times daily as needed for muscle spasms    Cervical strain, subsequent encounter, Left shoulder strain, subsequent encounter, Strain of trapezius muscle, right, subsequent encounter

## 2017-10-19 NOTE — PROGRESS NOTES
Subjective:    HPI                    Objective:    System    Physical Exam    Cecilia Cervical Evaluation    Posture:  Sitting: poor            Movement Loss:      Retraction (RET): mod  Extension (EXT): major                                                         ROS    Assessment/Plan:      SUBJECTIVE  Subjective changes as noted by pt:  Jose reports that she wasn't able to do her exercises as often as recommended. Performing 10 reps of her exercises 1-2x/day.  It hurts to perform them.   Current pain level: 6/10     Changes in function:  None     Adverse reaction to treatment or activity:  None    OBJECTIVE  Changes in objective findings:  Yes, See physical exam section and/or daily flowsheet for response to repeated movements.           ASSESSMENT  Jose continues to require intervention to meet STG and LTG's: PT  No change of symptoms has been noted.  Response to therapy has shown lack of progress in  pain level  Progress made towards STG/LTG?  None    PLAN  Continue current treatment plan until patient demonstrates readiness to progress to higher level exercises.    PTA/ATC plan:  N/A    Please refer to the daily flowsheet for treatment today, total treatment time and time spent performing 1:1 timed codes.

## 2017-10-19 NOTE — TELEPHONE ENCOUNTER
This writer attempted to contact Jose on 10/19/17      Reason for call relay message and unable to leave message, no VM.      If patient calls back:   Relay message pt needs to contact [facility] in [place] to set up this therapy, their # is [phone] a referral has been faxed to them.   , (read verbatim), document that pt called and close encounter.        Marina Deshpande MA

## 2017-10-19 NOTE — TELEPHONE ENCOUNTER
See phone note 10/16/17- sister Mike- patient was contacted and message was left .  Will need to check with insurance regarding coverage.   Please notify patient and fax referral.

## 2017-10-26 ENCOUNTER — THERAPY VISIT (OUTPATIENT)
Dept: PHYSICAL THERAPY | Facility: CLINIC | Age: 62
End: 2017-10-26
Payer: COMMERCIAL

## 2017-10-26 DIAGNOSIS — M25.562 ACUTE PAIN OF BOTH KNEES: Primary | ICD-10-CM

## 2017-10-26 DIAGNOSIS — M54.50 ACUTE BILATERAL LOW BACK PAIN WITHOUT SCIATICA: ICD-10-CM

## 2017-10-26 DIAGNOSIS — M54.2 NECK PAIN: ICD-10-CM

## 2017-10-26 DIAGNOSIS — M25.561 ACUTE PAIN OF BOTH KNEES: Primary | ICD-10-CM

## 2017-10-26 PROCEDURE — 97110 THERAPEUTIC EXERCISES: CPT | Mod: GP | Performed by: PHYSICAL THERAPIST

## 2017-10-26 PROCEDURE — 97112 NEUROMUSCULAR REEDUCATION: CPT | Mod: GP | Performed by: PHYSICAL THERAPIST

## 2017-10-26 PROCEDURE — 97140 MANUAL THERAPY 1/> REGIONS: CPT | Mod: GP | Performed by: PHYSICAL THERAPIST

## 2017-10-26 NOTE — PROGRESS NOTES
"Subjective:    HPI                    Objective:    System    Physical Exam    General     ROS    Assessment/Plan:      SUBJECTIVE  Subjective changes as noted by pt:  Was quite sore yesterday and not much exercise. Back is really \"killing her\" today. It starts at the top and goes down.  Performing stretches 10 reps 2x/day on average.      Current pain level: 6/10     Changes in function:  None     Adverse reaction to treatment or activity:  None    OBJECTIVE  Changes in objective findings:  Yes, See physical exam section and/or daily flowsheet for response to repeated movements.           ASSESSMENT  Daisie continues to require intervention to meet STG and LTG's: PT  No change of symptoms has been noted.  Response to therapy has shown lack of progress in  pain level  Progress made towards STG/LTG?  None    PLAN  Continue current treatment plan until patient demonstrates readiness to progress to higher level exercises.    PTA/ATC plan:  N/A    Please refer to the daily flowsheet for treatment today, total treatment time and time spent performing 1:1 timed codes.              "

## 2017-10-26 NOTE — MR AVS SNAPSHOT
After Visit Summary   10/26/2017    Jose New    MRN: [id number]           Patient Information     Date Of Birth          1955        Visit Information        Provider Department      10/26/2017 11:30 AM Reggie Camargo, PT [facility]        Today's Diagnoses     Acute pain of both knees    -  1    Acute bilateral low back pain without sciatica        Neck pain           Follow-ups after your visit        Your next 10 appointments already scheduled     Oct 31, 2017  5:20 PM CDT   GAEL Spine with Reggie Camargo, PT   [facility] ([facility])    [address]  [facility] [id number] [phone]            Nov 09, 2017  5:00 PM CST   GAEL Spine with Reggie Camargo, PT   [facility] ([facility])    [address]  [facility] [id number] [phone]              Who to contact     If you have questions or need follow up information about today's clinic visit or your schedule please contact [facility] PHYSICAL THERAPY directly at [phone].  Normal or non-critical lab and imaging results will be communicated to you by Hlidacky.czhart, letter or phone within 4 business days after the clinic has received the results. If you do not hear from us within 7 days, please contact the clinic through [email or web address].czhart or phone. If you have a critical or abnormal lab result, we will notify you by phone as soon as possible.  Submit refill requests through Ruxter or call your pharmacy and they will forward the refill request to us. Please allow 3 business days for your refill to be completed.          Additional Information About Your Visit        [email or web address].czhart Information     Ruxter lets you send messages to your doctor, view your test results, renew your prescriptions, schedule  "appointments and more. To sign up, go to [email or web address] . Click on \"Log in\" on the left side of the screen, which will take you to the Welcome page. Then click on \"Sign up Now\" on the right side of the page.     You will be asked to enter the access code listed below, as well as some personal information. Please follow the directions to create your username and password.     Your access code is: [email or web address]  Expires: 2018  5:45 PM     Your access code will  in 90 days. If you need help or a new code, please call your [place] clinic or [phone].        Care EveryWhere ID     This is your Care EveryWhere ID. This could be used by other organizations to access your [place] medical records  [id number]         Blood Pressure from Last 3 Encounters:   10/04/17 136/80   17 161/86   17 146/89    Weight from Last 3 Encounters:   17 86.8 kg (191 lb 4.8 oz)   16 87 kg (191 lb 14.4 oz)   16 87.6 kg (193 lb 1 oz)              We Performed the Following     MANUAL THER TECH,1+REGIONS,EA 15 MIN     NEUROMUSCULAR RE-EDUCATION     THERAPEUTIC EXERCISES        Primary Care Provider Office Phone # Fax #    Tia Ledesma PA-C [phone] [address] [facility] [address]        Equal Access to Services     [facility] : Hadii madhu ku hadasho Soomaali, waaxda luqadaha, qaybta kaalmada nasrin, paul vega [facility]oney . So [facility] [phone].    ATENCIÓN: Si john bran, tiene a pendleton disposición servicios gratuitos de asistencia lingüística. Albertina al [phone].    We comply with applicable federal civil rights laws and [place] laws. We do not discriminate on the basis of race, color, national origin, age, disability, sex, sexual orientation, or gender identity.            Thank you!     Thank you for choosing [facility] PHYSICAL THERAP[facility]  for your care. Our goal is always to provide you " with excellent care. Hearing back from our patients is one way we can continue to improve our services. Please take a few minutes to complete the written survey that you may receive in the mail after your visit with us. Thank you!             Your Updated Medication List - Protect others around you: Learn how to safely use, store and throw away your medicines at www.disposemymeds.org.          This list is accurate as of: 10/26/17 12:56 PM.  Always use your most recent med list.                   Brand Name Dispense Instructions for use Diagnosis    diclofenac 75 MG EC tablet    VOLTAREN    40 tablet    Take 1 tablet (75 mg) by mouth 2 times daily as needed    Degenerative disc disease, cervical, Degenerative disc disease, lumbar, Cervical radicular pain, Lumbar radicular pain       meloxicam 7.5 MG tablet    MOBIC    30 tablet    Take 1 tablet (7.5 mg) by mouth 2 times daily    Herniated disc, cervical       methocarbamol 500 MG tablet    ROBAXIN    120 tablet    Take 2 tablets (1,000 mg) by mouth 3 times daily as needed for muscle spasms    Cervical strain, subsequent encounter, Left shoulder strain, subsequent encounter, Strain of trapezius muscle, right, subsequent encounter

## 2018-01-11 ENCOUNTER — OFFICE VISIT (OUTPATIENT)
Dept: FAMILY MEDICINE | Facility: CLINIC | Age: 63
End: 2018-01-11
Payer: COMMERCIAL

## 2018-01-11 VITALS
TEMPERATURE: 97.7 F | OXYGEN SATURATION: 100 % | SYSTOLIC BLOOD PRESSURE: 126 MMHG | HEIGHT: 65 IN | RESPIRATION RATE: 16 BRPM | DIASTOLIC BLOOD PRESSURE: 82 MMHG | HEART RATE: 72 BPM

## 2018-01-11 DIAGNOSIS — M54.16 LUMBAR RADICULOPATHY: ICD-10-CM

## 2018-01-11 DIAGNOSIS — M54.12 CERVICAL RADICULOPATHY: ICD-10-CM

## 2018-01-11 DIAGNOSIS — M50.20 HERNIATED DISC, CERVICAL: Primary | ICD-10-CM

## 2018-01-11 PROCEDURE — 99213 OFFICE O/P EST LOW 20 MIN: CPT | Performed by: PHYSICIAN ASSISTANT

## 2018-01-11 RX ORDER — METHOCARBAMOL 500 MG/1
1000 TABLET, FILM COATED ORAL 3 TIMES DAILY PRN
Qty: 120 TABLET | Refills: 0 | Status: SHIPPED | OUTPATIENT
Start: 2018-01-11 | End: 2018-08-29

## 2018-01-11 ASSESSMENT — PAIN SCALES - GENERAL: PAINLEVEL: SEVERE PAIN (6)
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
## 2018-01-11 NOTE — PATIENT INSTRUCTIONS
Schedule physical with fasting labs.   Schedule mammogram at [facility] ([phone]).    Continue methocarbamol as needed for pain.    Continue ibuprofen up to 3 over the counter tablets four times a day as needed for pain

## 2018-01-11 NOTE — NURSING NOTE
"Chief Complaint   Patient presents with     Knee Pain       Initial /82 (BP Location: Right arm, Patient Position: Chair, Cuff Size: Adult Regular)  Pulse 72  Temp 97.7  F (36.5  C)  Resp 16  Ht 1.651 m (5' 5\")  SpO2 100%  Breastfeeding? No Estimated body mass index is 31.83 kg/(m^2) as calculated from the following:    Height as of 4/7/17: 1.651 m (5' 5\").    Weight as of 4/7/17: 86.8 kg (191 lb 4.8 oz).  Medication Reconciliation: arina Russo      "

## 2018-01-11 NOTE — MR AVS SNAPSHOT
"              After Visit Summary   1/11/2018    Jose New    MRN: [id number]           Patient Information     Date Of Birth          1955        Visit Information        Provider Department      1/11/2018 2:40 PM Tia Ledesma PA-C [facility]        Today's Diagnoses     Herniated disc, cervical    -  1    Cervical strain, subsequent encounter        Left shoulder strain, subsequent encounter        Strain of trapezius muscle, right, subsequent encounter          Care Instructions    Schedule physical with fasting labs.   Schedule mammogram at [facility] ([phone]).    Continue methocarbamol as needed for pain.    Continue ibuprofen up to 3 over the counter tablets four times a day as needed for pain            Follow-ups after your visit        Who to contact     If you have questions or need follow up information about today's clinic visit or your schedule please contact [facility] directly at [phone].  Normal or non-critical lab and imaging results will be communicated to you by TranSiChart, letter or phone within 4 business days after the clinic has received the results. If you do not hear from us within 7 days, please contact the clinic through TranSiChart or phone. If you have a critical or abnormal lab result, we will notify you by phone as soon as possible.  Submit refill requests through [employer] or call your pharmacy and they will forward the refill request to us. Please allow 3 business days for your refill to be completed.          Additional Information About Your Visit        TranSiChart Information     [employer] lets you send messages to your doctor, view your test results, renew your prescriptions, schedule appointments and more. To sign up, go to [email or web address] . Click on \"Log in\" on the left side of the screen, which will take you to the Welcome page. Then click on \"Sign up Now\" on the right side " "of the page.     You will be asked to enter the access code listed below, as well as some personal information. Please follow the directions to create your username and password.     Your access code is: [email or web address]  Expires: 2018  4:45 PM     Your access code will  in 90 days. If you need help or a new code, please call your [place] clinic or [phone].        Care EveryWhere ID     This is your Care EveryWhere ID. This could be used by other organizations to access your [place] medical records  [id number]        Your Vitals Were     Pulse Temperature Respirations Height Pulse Oximetry Breastfeeding?    72 97.7  F (36.5  C) 16 1.651 m (5' 5\") 100% No       Blood Pressure from Last 3 Encounters:   18 126/82   10/04/17 136/80   17 161/86    Weight from Last 3 Encounters:   17 86.8 kg (191 lb 4.8 oz)   16 87 kg (191 lb 14.4 oz)   16 87.6 kg (193 lb 1 oz)              Today, you had the following     No orders found for display         Where to get your medicines      These medications were sent to [facility] [address] [place] [address] [address] [facility] [address]  [address] [facility] [facility] [address]     Phone:  [phone]     methocarbamol 500 MG tablet          Primary Care Provider Office Phone # Fax #    Tia Ledesma PA-C [phone] [address] [place] [address] [address]  [facility] [address]        Equal Access to Services     [facility]: Hadii aad ku hadasho Soomaali, waaxda luqadaha, qaybta kaalmada adeegyaesther, paul angela. So [facility] [phone].    ATENCIÓN: Si habla español, tiene a pendleton disposición servicios gratuitos de asistencia lingüística. Llame al [phone].    We comply with applicable federal civil rights laws and [place] laws. We do not discriminate on the basis of race, color, national origin, age, disability, sex, sexual orientation, or gender identity.            Thank you!     Thank you for choosing " [facility]  for your care. Our goal is always to provide you with excellent care. Hearing back from our patients is one way we can continue to improve our services. Please take a few minutes to complete the written survey that you may receive in the mail after your visit with us. Thank you!             Your Updated Medication List - Protect others around you: Learn how to safely use, store and throw away your medicines at www.disposemymeds.org.          This list is accurate as of: 1/11/18  3:26 PM.  Always use your most recent med list.                   Brand Name Dispense Instructions for use Diagnosis    methocarbamol 500 MG tablet    ROBAXIN    120 tablet    Take 2 tablets (1,000 mg) by mouth 3 times daily as needed for muscle spasms    Cervical strain, subsequent encounter, Left shoulder strain, subsequent encounter, Strain of trapezius muscle, right, subsequent encounter, Herniated disc, cervical

## 2018-01-14 PROBLEM — M54.16 LUMBAR RADICULOPATHY: Status: ACTIVE | Noted: 2018-01-14

## 2018-01-14 PROBLEM — M54.12 CERVICAL RADICULOPATHY: Status: ACTIVE | Noted: 2018-01-14

## 2018-02-23 ENCOUNTER — TELEPHONE (OUTPATIENT)
Dept: FAMILY MEDICINE | Facility: CLINIC | Age: 63
End: 2018-02-23

## 2018-02-23 NOTE — TELEPHONE ENCOUNTER
..Reason for Call:  Other     Detailed comments: Patient would like a call back she has some questions.    Phone Number Patient can be reached at: Home number on file [phone] (home)    Best Time: anytime    Can we leave a detailed message on this number? YES    Call taken on 2/23/2018 at 3:58 PM by Wei Brooke

## 2018-02-23 NOTE — TELEPHONE ENCOUNTER
Spoke with Pt  Who is having trouble walking long distances and was wondering about getting a  handicap sticker.     Pt says she hurts when she has to walk long distances      Please advise      Gricel Veliz MA    Patient can be reached at her work # [phone] until 3 pm can try cell after that time at [phone]

## 2018-02-27 NOTE — TELEPHONE ENCOUNTER
DMV form placed on [facility] desk.    This writer attempted to contact pt on 02/27/18      Reason for call question and left message to return call.      If patient calls back:   Relay message ask pt how many blockes she can walk comfortably, (read verbatim), document that pt called and route chart to pcp.        Marina Deshpande MA

## 2018-02-27 NOTE — TELEPHONE ENCOUNTER
Please place DMV form on my desk to complete for patient.   Please call patient and ask how many blocks is she able to walk comfortably

## 2018-02-28 NOTE — TELEPHONE ENCOUNTER
Reason for Call:  Other returning call    Detailed comments: Patient called back cant walk any blocks pinched nerve in back.    Phone Number Patient can be reached at: Cell number on file:    Telephone Information:   Mobile [phone]       Best Time: any    Can we leave a detailed message on this number? YES    Call taken on 2/28/2018 at 4:26 PM by Jordyn Hernandez

## 2018-03-01 NOTE — TELEPHONE ENCOUNTER
[place] Department of Public Safety  and Vehicle Services Form completed. Please place at  and notify available for pickup

## 2018-03-21 ENCOUNTER — OFFICE VISIT (OUTPATIENT)
Dept: ORTHOPEDICS | Facility: CLINIC | Age: 63
End: 2018-03-21
Payer: COMMERCIAL

## 2018-03-21 VITALS — SYSTOLIC BLOOD PRESSURE: 144 MMHG | HEART RATE: 82 BPM | DIASTOLIC BLOOD PRESSURE: 79 MMHG

## 2018-03-21 DIAGNOSIS — M54.12 CERVICAL RADICULOPATHY: ICD-10-CM

## 2018-03-21 DIAGNOSIS — M54.16 LUMBAR RADICULOPATHY: Primary | ICD-10-CM

## 2018-03-21 PROCEDURE — 99213 OFFICE O/P EST LOW 20 MIN: CPT | Performed by: PREVENTIVE MEDICINE

## 2018-03-21 ASSESSMENT — PAIN SCALES - GENERAL: PAINLEVEL: SEVERE PAIN (7)

## 2018-03-21 NOTE — PATIENT INSTRUCTIONS
Thanks for coming today.  Ortho/Sports Medicine Clinic  [address] [place] [address]    To schedule future appointments in Ortho Clinic, you may call [phone].    To schedule ordered imaging by your provider:   Call Central Imaging [phone]    To schedule an injection ordered by your provider:  Call Central Imaging Injection scheduling line: [phone]  [email or web address] available online at:  [email or web address]    Please call if any further questions or concerns ([phone]).  Clinic hours 8 am to 5 pm.    Return to clinic (call) if symptoms worsen or fail to improve.

## 2018-03-21 NOTE — LETTER
3/21/2018         RE: Jose New  [address] [address]        Dear Colleague,    Thank you for referring your patient, Jose New, to the [facility]. Please see a copy of my visit note below.    HISTORY OF PRESENT ILLNESS  Ms. Alexandra New is a pleasant 63 year old year old female who presents to clinic today for followup for her cervical and lumbar spine  She is doing better with her PT and using robaxin with some relief at the end of her days  She would like to discuss the possibility of injections at some point but wants to do more PT first.    MEDICAL HISTORY  Patient Active Problem List   Diagnosis     Obesity (BMI 30.0-34.9)     Herniated disc, cervical     Lumbar radiculopathy     Cervical radiculopathy       Current Outpatient Prescriptions   Medication Sig Dispense Refill     methocarbamol (ROBAXIN) 500 MG tablet Take 2 tablets (1,000 mg) by mouth 3 times daily as needed for muscle spasms 120 tablet 0       No Known Allergies    Family History   Problem Relation Age of Onset     CEREBROVASCULAR DISEASE Mother       age 93     Glaucoma Mother      Coronary Artery Disease Sister 73     MI - smoker     DIABETES No family hx of      Colon Cancer No family hx of      Breast Cancer No family hx of        Additional medical/Social/Surgical histories reviewed in [facility] and updated as appropriate.     REVIEW OF SYSTEMS (3/21/2018)  10 point ROS of systems including Constitutional, Eyes, Respiratory, Cardiovascular, Gastroenterology, Genitourinary, Integumentary, Musculoskeletal, Psychiatric were all negative except for pertinent positives noted in my HPI.     PHYSICAL EXAM  Vitals:    18 1506   BP: 144/79   Pulse: 82     Vital Signs: /79  Pulse 82 Patient declined being weighed. There is no height or weight on file to calculate BMI.    General  - normal appearance, in no obvious distress  CV  - normal peripheral perfusion  Pulm  -  normal respiratory pattern, non-labored  Musculoskeletal - lumbar spine  - stance: normal gait without limp, no obvious leg length discrepancy, normal heel and toe walk  - inspection: normal bone and joint alignment, no obvious scoliosis  - palpation: no paravertebral or bony tenderness  - ROM: flexion exacerbates pain in low back, normal extension, sidebending, rotation  - strength: lower extremities 5/5 in all planes  - special tests:  (+) straight leg raise- bilaterally  (+) slump test- lower back pain  Neuro  - patellar and Achilles DTRs 2+ bilaterally, NO lower extremity sensory deficit throughout L5 distribution, grossly normal coordination, normal muscle tone  Skin  - no ecchymosis, erythema, warmth, or induration, no obvious rash  Psych  - interactive, appropriate, normal mood and affect  Cervical spine: has some pain with ROM testing, and positive spurlings bilaterally    ASSESSMENT & PLAN  64 yo female with lumbar and cervical pain due to ddd, stable  Discussed options of injections for both areas  Would like to cont. PT for now and then decide   Will let me know PRN for injections    Diogenes Whaley MD, CAQSM      Again, thank you for allowing me to participate in the care of your patient.        Sincerely,        Diogenes Whaley MD

## 2018-03-21 NOTE — PROGRESS NOTES
HISTORY OF PRESENT ILLNESS  Ms. Alexandra New is a pleasant 63 year old year old female who presents to clinic today for followup for her cervical and lumbar spine  She is doing better with her PT and using robaxin with some relief at the end of her days  She would like to discuss the possibility of injections at some point but wants to do more PT first.    MEDICAL HISTORY  Patient Active Problem List   Diagnosis     Obesity (BMI 30.0-34.9)     Herniated disc, cervical     Lumbar radiculopathy     Cervical radiculopathy       Current Outpatient Prescriptions   Medication Sig Dispense Refill     methocarbamol (ROBAXIN) 500 MG tablet Take 2 tablets (1,000 mg) by mouth 3 times daily as needed for muscle spasms 120 tablet 0       No Known Allergies    Family History   Problem Relation Age of Onset     CEREBROVASCULAR DISEASE Mother       age 93     Glaucoma Mother      Coronary Artery Disease Sister 73     MI - smoker     DIABETES No family hx of      Colon Cancer No family hx of      Breast Cancer No family hx of        Additional medical/Social/Surgical histories reviewed in [facility] and updated as appropriate.     REVIEW OF SYSTEMS (3/21/2018)  10 point ROS of systems including Constitutional, Eyes, Respiratory, Cardiovascular, Gastroenterology, Genitourinary, Integumentary, Musculoskeletal, Psychiatric were all negative except for pertinent positives noted in my HPI.     PHYSICAL EXAM  Vitals:    18 1506   BP: 144/79   Pulse: 82     Vital Signs: /79  Pulse 82 Patient declined being weighed. There is no height or weight on file to calculate BMI.    General  - normal appearance, in no obvious distress  CV  - normal peripheral perfusion  Pulm  - normal respiratory pattern, non-labored  Musculoskeletal - lumbar spine  - stance: normal gait without limp, no obvious leg length discrepancy, normal heel and toe walk  - inspection: normal bone and joint alignment, no obvious scoliosis  - palpation: no  paravertebral or bony tenderness  - ROM: flexion exacerbates pain in low back, normal extension, sidebending, rotation  - strength: lower extremities 5/5 in all planes  - special tests:  (+) straight leg raise- bilaterally  (+) slump test- lower back pain  Neuro  - patellar and Achilles DTRs 2+ bilaterally, NO lower extremity sensory deficit throughout L5 distribution, grossly normal coordination, normal muscle tone  Skin  - no ecchymosis, erythema, warmth, or induration, no obvious rash  Psych  - interactive, appropriate, normal mood and affect  Cervical spine: has some pain with ROM testing, and positive spurlings bilaterally    ASSESSMENT & PLAN  62 yo female with lumbar and cervical pain due to ddd, stable  Discussed options of injections for both areas  Would like to cont. PT for now and then decide   Will let me know PRN for injections    Diogenes Whaley MD, CAQSM

## 2018-03-21 NOTE — NURSING NOTE
"Jose New's goals for this visit include: Follow up for cervical spine pain and low back pain   She requests these members of her care team be copied on today's visit information: no    PCP: Tia Ledesma    Referring Provider:  No referring provider defined for this encounter.    Chief Complaint   Patient presents with     RECHECK     Follow up for neck pain and low back pain       Initial /79  Pulse 82 Estimated body mass index is 31.83 kg/(m^2) as calculated from the following:    Height as of 4/7/17: 1.651 m (5' 5\").    Weight as of 4/7/17: 86.8 kg (191 lb 4.8 oz).  Medication Reconciliation: complete  "

## 2018-03-21 NOTE — MR AVS SNAPSHOT
After Visit Summary   3/21/2018    Jose New    MRN: [id number]           Patient Information     Date Of Birth          1955        Visit Information        Provider Department      3/21/2018 3:40 PM Diogenes Whaley MD [facility]        Today's Diagnoses     Lumbar radiculopathy    -  1    Cervical radiculopathy          Care Instructions    Thanks for coming today.  Ortho/Sports Medicine Clinic  [address] [place] [address]    To schedule future appointments in Ortho Clinic, you may call [phone].    To schedule ordered imaging by your provider:   Call Central Imaging [phone]    To schedule an injection ordered by your provider:  Call Central Imaging Injection scheduling line: [phone]  The BabyPlus Company LL[employer] available online at:  [email or web address]    Please call if any further questions or concerns ([phone]).  Clinic hours 8 am to 5 pm.    Return to clinic (call) if symptoms worsen or fail to improve.          Follow-ups after your visit        Additional Services     PHYSICAL THERAPY REFERRAL (External-Prints)       Physical Therapy Referral                  Who to contact     If you have questions or need follow up information about today's clinic visit or your schedule please contact [facility] directly at [phone].  Normal or non-critical lab and imaging results will be communicated to you by MyChart, letter or phone within 4 business days after the clinic has received the results. If you do not hear from us within 7 days, please contact the clinic through "[employer]hart or phone. If you have a critical or abnormal lab result, we will notify you by phone as soon as possible.  Submit refill requests through SeekSherpa or call your pharmacy and they will forward the refill request to us. Please allow 3 business days for your refill to be completed.          Additional Information About Your Visit         Agilvax Information     Agilvax is an electronic gateway that provides easy, online access to your medical records. With Agilvax, you can request a clinic appointment, read your test results, renew a prescription or communicate with your care team.     To sign up for Agilvax visit the website at [email or web address]   You will be asked to enter the access code listed below, as well as some personal information. Please follow the directions to create your username and password.     Your access code is: [email or web address]  Expires: 2018  1:49 PM     Your access code will  in 90 days. If you need help or a new code, please contact your [facility] or call [phone] for assistance.        Care EveryWhere ID     This is your Care EveryWhere ID. This could be used by other organizations to access your [facility] medical records  [id number]        Your Vitals Were     Pulse                   82            Blood Pressure from Last 3 Encounters:   18 144/79   18 126/82   10/04/17 136/80    Weight from Last 3 Encounters:   17 86.8 kg (191 lb 4.8 oz)   16 87 kg (191 lb 14.4 oz)   16 87.6 kg (193 lb 1 oz)              We Performed the Following     PHYSICAL THERAPY REFERRAL (External-Prints)        Primary Care Provider Office Phone # Fax #    Tia Ledesma PA-C [phone] [address]  [place] [address]        Equal Access to Services     OMAR BARCENAS : Hadii aad ku hadasho Soomaali, waaxda luqadaha, qaybta kaalmada adeegyada, waxay idiin haynolvian roberto rooney . So [facility] [place] [facility] [phone].    ATENCIÓN: Si habla shant, tiene a pendleton disposición servicios gratuitos de asistencia lingüística. Llame al [phone].    We comply with applicable federal civil rights laws and [place] laws. We do not discriminate on the basis of race, color, national origin, age, disability, sex, sexual orientation, or gender  identity.            Thank you!     Thank you for choosing [facility]  for your care. Our goal is always to provide you with excellent care. Hearing back from our patients is one way we can continue to improve our services. Please take a few minutes to complete the written survey that you may receive in the mail after your visit with us. Thank you!             Your Updated Medication List - Protect others around you: Learn how to safely use, store and throw away your medicines at www.disposemymeds.org.          This list is accurate as of 3/21/18 11:59 PM.  Always use your most recent med list.                   Brand Name Dispense Instructions for use Diagnosis    methocarbamol 500 MG tablet    ROBAXIN    120 tablet    Take 2 tablets (1,000 mg) by mouth 3 times daily as needed for muscle spasms    Herniated disc, cervical

## 2018-08-29 ENCOUNTER — OFFICE VISIT (OUTPATIENT)
Dept: ORTHOPEDICS | Facility: CLINIC | Age: 63
End: 2018-08-29
Payer: COMMERCIAL

## 2018-08-29 VITALS — DIASTOLIC BLOOD PRESSURE: 87 MMHG | SYSTOLIC BLOOD PRESSURE: 149 MMHG | HEART RATE: 70 BPM

## 2018-08-29 DIAGNOSIS — M54.16 LUMBAR RADICULOPATHY: Primary | ICD-10-CM

## 2018-08-29 DIAGNOSIS — M50.30 DEGENERATION OF CERVICAL INTERVERTEBRAL DISC: ICD-10-CM

## 2018-08-29 DIAGNOSIS — M54.12 CERVICAL RADICULOPATHY: ICD-10-CM

## 2018-08-29 PROCEDURE — 99213 OFFICE O/P EST LOW 20 MIN: CPT | Performed by: PREVENTIVE MEDICINE

## 2018-08-29 ASSESSMENT — PAIN SCALES - GENERAL: PAINLEVEL: SEVERE PAIN (7)

## 2018-08-29 NOTE — MR AVS SNAPSHOT
After Visit Summary   2018    Jose New    MRN: [id number]           Patient Information     Date Of Birth          1955        Visit Information        Provider Department      2018 3:20 PM Diogenes Whaley MD [facility]        Today's Diagnoses     Lumbar radiculopathy    -  1    Cervical radiculopathy        Degeneration of cervical intervertebral disc          Care Instructions    Thanks for coming today.  Ortho/Sports Medicine Clinic  [address] [place] [address]    To schedule future appointments in Ortho Clinic, you may call [phone].    To schedule ordered imaging by your provider:   Call Central Imaging [phone]    To schedule an injection ordered by your provider:  Call Central Imaging Injection scheduling line: [phone]  Replicont available online at:  [email or web address]    Please call if any further questions or concerns ([phone]).  Clinic hours 8 am to 5 pm.    Return to clinic (call) if symptoms worsen or fail to improve.          Follow-ups after your visit        Future tests that were ordered for you today     Open Future Orders        Priority Expected Expires Ordered    MR Cervical Spine w/o Contrast Routine  2019            Who to contact     If you have questions or need follow up information about today's clinic visit or your schedule please contact [facility] directly at [phone].  Normal or non-critical lab and imaging results will be communicated to you by MyChart, letter or phone within 4 business days after the clinic has received the results. If you do not hear from us within 7 days, please contact the clinic through MyChart or phone. If you have a critical or abnormal lab result, we will notify you by phone as soon as possible.  Submit refill requests through Senzari or call your pharmacy and they will forward the refill request to us.  Please allow 3 business days for your refill to be completed.          Additional Information About Your Visit        [email or web address].tvhart Information     Syncurity is an electronic gateway that provides easy, online access to your medical records. With Syncurity, you can request a clinic appointment, read your test results, renew a prescription or communicate with your care team.     To sign up for Syncurity visit the website at [email or web address]   You will be asked to enter the access code listed below, as well as some personal information. Please follow the directions to create your username and password.     Your access code is: [email or web address]  Expires: 2018  4:06 PM     Your access code will  in 90 days. If you need help or a new code, please contact your [facility] or call [phone] for assistance.        Care EveryWhere ID     This is your Care EveryWhere ID. This could be used by other organizations to access your [facility] medical records  [id number]        Your Vitals Were     Pulse                   70            Blood Pressure from Last 3 Encounters:   18 149/87   18 144/79   18 126/82    Weight from Last 3 Encounters:   17 86.8 kg (191 lb 4.8 oz)   16 87 kg (191 lb 14.4 oz)   16 87.6 kg (193 lb 1 oz)                 Today's Medication Changes          These changes are accurate as of 18  4:06 PM.  If you have any questions, ask your nurse or doctor.               Start taking these medicines.        Dose/Directions    tiZANidine 4 MG tablet   Commonly known as:  ZANAFLEX   Used for:  Lumbar radiculopathy, Cervical radiculopathy        Dose:  4-8 mg   Take 1-2 tablets (4-8 mg) by mouth nightly as needed   Quantity:  30 tablet   Refills:  1            Where to get your medicines      These medications were sent to [facility] [id number] [place], [place] - [address]  [address] [facility] [address]      Phone:  [phone]     tiZANidine 4 MG tablet                Primary Care Provider Office Phone # Fax #    Tia Ledesma PA-C [phone] [address] [address]        Equal Access to Services     VI BARCENAS : Hadii aad ku hadasho Soomaali, waaxda luqadaha, qaybta kaalmada adeegyada, waxay idiin hayaan ademarcus christi nevin . So [facility] [phone].    ATENCIÓN: Si habla español, tiene a pendleton disposición servicios gratuitos de asistencia lingüística. Llame al [phone].    We comply with applicable federal civil rights laws and [place] laws. We do not discriminate on the basis of race, color, national origin, age, disability, sex, sexual orientation, or gender identity.            Thank you!     Thank you for choosing [facility]  for your care. Our goal is always to provide you with excellent care. Hearing back from our patients is one way we can continue to improve our services. Please take a few minutes to complete the written survey that you may receive in the mail after your visit with us. Thank you!             Your Updated Medication List - Protect others around you: Learn how to safely use, store and throw away your medicines at www.disposemymeds.org.          This list is accurate as of 8/29/18  4:06 PM.  Always use your most recent med list.                   Brand Name Dispense Instructions for use Diagnosis    IBUPROFEN PO           tiZANidine 4 MG tablet    ZANAFLEX    30 tablet    Take 1-2 tablets (4-8 mg) by mouth nightly as needed    Lumbar radiculopathy, Cervical radiculopathy

## 2018-08-29 NOTE — PATIENT INSTRUCTIONS
Thanks for coming today.  Ortho/Sports Medicine Clinic  [address] [place] [address]    To schedule future appointments in Ortho Clinic, you may call [phone].    To schedule ordered imaging by your provider:   Call Central Imaging [phone]    To schedule an injection ordered by your provider:  Call Central Imaging Injection scheduling line: [phone]  Starvinehart available online at:  [email or web address]    Please call if any further questions or concerns ([phone]).  Clinic hours 8 am to 5 pm.    Return to clinic (call) if symptoms worsen or fail to improve.

## 2018-08-29 NOTE — PROGRESS NOTES
HISTORY OF PRESENT ILLNESS  Ms. Alexandra New is a pleasant 63 year old year old female who presents to clinic today for followup for her neck and back pain  She has been doing Physical therapy  She still has pain in her neck and in her low back and her right leg  She feels like she has had improvement overall from the physical therapy and less pain overall  She has not had resolution of her pain, and feels like it 'will always be there'  She knows the PT is helping, but her neck is still stiff and painful to move daily      MEDICAL HISTORY  Patient Active Problem List   Diagnosis     Obesity (BMI 30.0-34.9)     Herniated disc, cervical     Lumbar radiculopathy     Cervical radiculopathy       Current Outpatient Prescriptions   Medication Sig Dispense Refill     IBUPROFEN PO          No Known Allergies    Family History   Problem Relation Age of Onset     Cerebrovascular Disease Mother       age 93     Glaucoma Mother      Coronary Artery Disease Sister 73     MI - smoker     Diabetes No family hx of      Colon Cancer No family hx of      Breast Cancer No family hx of        Additional medical/Social/Surgical histories reviewed in [facility] and updated as appropriate.     REVIEW OF SYSTEMS (2018)  10 point ROS of systems including Constitutional, Eyes, Respiratory, Cardiovascular, Gastroenterology, Genitourinary, Integumentary, Musculoskeletal, Psychiatric were all negative except for pertinent positives noted in my HPI.     PHYSICAL EXAM  Vitals:    18 1528   BP: 149/87   Pulse: 70     Vital Signs: /87  Pulse 70 Patient declined being weighed. There is no height or weight on file to calculate BMI.    General  - normal appearance, in no obvious distress  CV  - normal peripheral perfusion  Pulm  - normal respiratory pattern, non-labored  Musculoskeletal - lumbar spine  - stance: normal gait without limp, no obvious leg length discrepancy, normal heel and toe walk  - inspection: normal bone and joint  alignment, no obvious scoliosis  - palpation: no paravertebral or bony tenderness  - ROM: flexion exacerbates pain, normal extension, sidebending, rotation  - strength: lower extremities 5/5 in all planes  - special tests:  (+) straight leg raise- right  (+) slump test- right low back  Neuro  - patellar and Achilles DTRs 2+ bilaterally, NO lower extremity sensory deficit throughout L5 distribution, grossly normal coordination, normal muscle tone  Skin  - no ecchymosis, erythema, warmth, or induration, no obvious rash  Psych  - interactive, appropriate, normal mood and affect  Cervical: has pain with flexion and extension of neck, and positive spurlings bilaterally    ASSESSMENT & PLAN  62 yo female with cervical ddd, and lumbar disc herniation, radicular pain, not resolved  Has been a year since MRIs  Will order cervical MRI and consider ALBERT at her convenience  Cont. Pool PT  Start tizanadine at night, as flexeril was too sedating        Diogenes Whaley MD, CAQSM

## 2018-08-29 NOTE — LETTER
2018         RE: Jose New  [address] [facility] [address]        Dear Colleague,    Thank you for referring your patient, Jose New, to the [facility]. Please see a copy of my visit note below.    HISTORY OF PRESENT ILLNESS  Ms. Alexandra New is a pleasant 63 year old year old female who presents to clinic today for followup for her neck and back pain  She has been doing Physical therapy  She still has pain in her neck and in her low back and her right leg  She feels like she has had improvement overall from the physical therapy and less pain overall  She has not had resolution of her pain, and feels like it 'will always be there'  She knows the PT is helping, but her neck is still stiff and painful to move daily      MEDICAL HISTORY  Patient Active Problem List   Diagnosis     Obesity (BMI 30.0-34.9)     Herniated disc, cervical     Lumbar radiculopathy     Cervical radiculopathy       Current Outpatient Prescriptions   Medication Sig Dispense Refill     IBUPROFEN PO          No Known Allergies    Family History   Problem Relation Age of Onset     Cerebrovascular Disease Mother       age 93     Glaucoma Mother      Coronary Artery Disease Sister 73     MI - smoker     Diabetes No family hx of      Colon Cancer No family hx of      Breast Cancer No family hx of        Additional medical/Social/Surgical histories reviewed in [facility] and updated as appropriate.     REVIEW OF SYSTEMS (2018)  10 point ROS of systems including Constitutional, Eyes, Respiratory, Cardiovascular, Gastroenterology, Genitourinary, Integumentary, Musculoskeletal, Psychiatric were all negative except for pertinent positives noted in my HPI.     PHYSICAL EXAM  Vitals:    18 1528   BP: 149/87   Pulse: 70     Vital Signs: /87  Pulse 70 Patient declined being weighed. There is no height or weight on file to calculate BMI.    General  - normal appearance, in no  obvious distress  CV  - normal peripheral perfusion  Pulm  - normal respiratory pattern, non-labored  Musculoskeletal - lumbar spine  - stance: normal gait without limp, no obvious leg length discrepancy, normal heel and toe walk  - inspection: normal bone and joint alignment, no obvious scoliosis  - palpation: no paravertebral or bony tenderness  - ROM: flexion exacerbates pain, normal extension, sidebending, rotation  - strength: lower extremities 5/5 in all planes  - special tests:  (+) straight leg raise- right  (+) slump test- right low back  Neuro  - patellar and Achilles DTRs 2+ bilaterally, NO lower extremity sensory deficit throughout L5 distribution, grossly normal coordination, normal muscle tone  Skin  - no ecchymosis, erythema, warmth, or induration, no obvious rash  Psych  - interactive, appropriate, normal mood and affect  Cervical: has pain with flexion and extension of neck, and positive spurlings bilaterally    ASSESSMENT & PLAN  64 yo female with cervical ddd, and lumbar disc herniation, radicular pain, not resolved  Has been a year since MRIs  Will order cervical MRI and consider ALBERT at her convenience  Cont. Pool PT  Start tizanadine at night, as flexeril was too sedating        Diogenes Whaley MD, CAQSM      Again, thank you for allowing me to participate in the care of your patient.        Sincerely,        Diogenes Whaley MD

## 2018-08-30 ENCOUNTER — TELEPHONE (OUTPATIENT)
Dept: PEDIATRICS | Facility: CLINIC | Age: 63
End: 2018-08-30

## 2018-08-30 NOTE — TELEPHONE ENCOUNTER
The Patient declined Preventive Health Screens for: Mammogram  Please review chart and follow-up with patient if needed.    Thank you

## 2018-09-05 ENCOUNTER — TELEPHONE (OUTPATIENT)
Dept: ORTHOPEDICS | Facility: CLINIC | Age: 63
End: 2018-09-05

## 2018-09-05 NOTE — TELEPHONE ENCOUNTER
[facility] Call Center    Phone Message    May a detailed message be left on voicemail: yes    Reason for Call: Other: Patient is calling to request renewal for her disabilty parking permit. Please advise.  She is requesting a letter with her diagnosis with a Physician signature. Patient would like a call when ready, she will  in clinic.     Action Taken: Message routed to:  Adult Clinics: Sports Medicine p [phone]

## 2018-09-06 NOTE — TELEPHONE ENCOUNTER
Per honorio with Dr. Whaley- He would like patient to get the cervical MRI first before discussing disability paperwork.   Dr. Whaley did not order original parking permit paperwork, so it was suggested that she request the renewal from that provider.   Patient understands. No further questions or concerns.

## 2018-09-25 ENCOUNTER — TELEPHONE (OUTPATIENT)
Dept: FAMILY MEDICINE | Facility: CLINIC | Age: 63
End: 2018-09-25

## 2018-09-25 NOTE — TELEPHONE ENCOUNTER
Reason for Call:  Other     Detailed comments: patient needs her parking permit renewed because of the pain she is having per Dr Whaley. Please call to advise    Phone Number Patient can be reached at: Other phone number:[phone] until 3:30pm, after that call   [phone]    Best Time: any    Can we leave a detailed message on this number? YES    Call taken on 9/25/2018 at 9:07 AM by Melissa Arias

## 2018-09-25 NOTE — TELEPHONE ENCOUNTER
Needs to be seen. Blood pressure high on several occasions.    Advise to schedule physical with fasting labs

## 2018-09-26 ENCOUNTER — DOCUMENTATION ONLY (OUTPATIENT)
Dept: ORTHOPEDICS | Facility: CLINIC | Age: 63
End: 2018-09-26

## 2018-09-26 ENCOUNTER — OFFICE VISIT (OUTPATIENT)
Dept: FAMILY MEDICINE | Facility: CLINIC | Age: 63
End: 2018-09-26
Payer: COMMERCIAL

## 2018-09-26 VITALS
HEART RATE: 82 BPM | OXYGEN SATURATION: 98 % | DIASTOLIC BLOOD PRESSURE: 70 MMHG | TEMPERATURE: 98.2 F | RESPIRATION RATE: 18 BRPM | SYSTOLIC BLOOD PRESSURE: 122 MMHG

## 2018-09-26 DIAGNOSIS — M54.16 LUMBAR RADICULOPATHY: ICD-10-CM

## 2018-09-26 DIAGNOSIS — M54.12 CERVICAL RADICULOPATHY: Primary | ICD-10-CM

## 2018-09-26 PROCEDURE — 99213 OFFICE O/P EST LOW 20 MIN: CPT | Performed by: PHYSICIAN ASSISTANT

## 2018-09-26 ASSESSMENT — PAIN SCALES - GENERAL: PAINLEVEL: EXTREME PAIN (8)
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
## 2018-09-26 NOTE — MR AVS SNAPSHOT
After Visit Summary   9/26/2018    Jose New    MRN: [id number]           Patient Information     Date Of Birth          1955        Visit Information        Provider Department      9/26/2018 6:40 PM Tia Ledesma PA-C [facility]        Care Instructions    Please schedule physical with fasting labs (nothing to eat or drink except water and/or medications 10 hours prior to appointment) at your earliest convenience .       At [facility], we strive to deliver an exceptional experience to you, every time we see you.  If you receive a survey in the mail, please send us back your thoughts. We really do value your feedback.    Your care team:     Family Medicine   GURINDER Ayala MD Emily Bunt, APRN CNP S. Matthew Hockett, MD Pamela Kolacz, MD Angela Wermerskirchen, MD         Clinic hours: Monday - Wednesday 7 am-7 pm   Thursdays and Fridays 7 am-5 pm.     [facility]: Monday - Friday 11 am-9 pm,   Saturday and Sunday 9 am-5 pm.    [facility]: Monday -Thursday 8 am-8 pm; Friday 8 am-6 pm; Saturday and Sunday 9 am-5 pm.     [facility]: Monday - Thursday 8 am - 7 pm; Friday 8 am - 6 pm    Clinic: [phone]   [facility]: [phone]   [facility]: [phone]                    Follow-ups after your visit        Follow-up notes from your care team     Return in about 1 month (around 10/26/2018) for Physical Exam.      Who to contact     If you have questions or need follow up information about today's clinic visit or your schedule please contact [facility] directly at [phone].  Normal or non-critical lab and imaging results will be communicated to you by MyChart, letter or phone within 4 business days after the clinic has received the results. If you do not hear from us within 7 days, please contact the  "clinic through Proficiencyhart or phone. If you have a critical or abnormal lab result, we will notify you by phone as soon as possible.  Submit refill requests through ahoyDoc or call your pharmacy and they will forward the refill request to us. Please allow 3 business days for your refill to be completed.          Additional Information About Your Visit        Proficiencyhart Information     ahoyDoc lets you send messages to your doctor, view your test results, renew your prescriptions, schedule appointments and more. To sign up, go to [email or web address] . Click on \"Log in\" on the left side of the screen, which will take you to the Welcome page. Then click on \"Sign up Now\" on the right side of the page.     You will be asked to enter the access code listed below, as well as some personal information. Please follow the directions to create your username and password.     Your access code is: [email or web address]  Expires: 2018  4:06 PM     Your access code will  in 90 days. If you need help or a new code, please call your [place] clinic or [phone].        Care EveryWhere ID     This is your Care EveryWhere ID. This could be used by other organizations to access your [place] medical records  [id number]        Your Vitals Were     Pulse Temperature Respirations Last Period Pulse Oximetry Breastfeeding?    82 98.2  F (36.8  C) (Oral) 18 (Exact Date) 98% No       Blood Pressure from Last 3 Encounters:   18 122/70   18 149/87   18 144/79    Weight from Last 3 Encounters:   17 86.8 kg (191 lb 4.8 oz)   16 87 kg (191 lb 14.4 oz)   16 87.6 kg (193 lb 1 oz)              Today, you had the following     No orders found for display       Primary Care Provider Office Phone # Fax #    Tia Ledesma PA-C [phone] [address]  [facility] [address]        Equal Access to Services     OMAR BARCENAS AH: Harley Mabry, chastity agustin, qaybta " paul hernández christi rooney ah. So [facility] [phone].    ATENCIÓN: Si john bran, tiene a pendleton disposición servicios gratuitos de asistencia lingüística. Albertina al [phone].    We comply with applicable federal civil rights laws and [place] laws. We do not discriminate on the basis of race, color, national origin, age, disability, sex, sexual orientation, or gender identity.            Thank you!     Thank you for choosing [facility]  for your care. Our goal is always to provide you with excellent care. Hearing back from our patients is one way we can continue to improve our services. Please take a few minutes to complete the written survey that you may receive in the mail after your visit with us. Thank you!             Your Updated Medication List - Protect others around you: Learn how to safely use, store and throw away your medicines at www.disposemymeds.org.          This list is accurate as of 9/26/18  7:02 PM.  Always use your most recent med list.                   Brand Name Dispense Instructions for use Diagnosis    IBUPROFEN PO           tiZANidine 4 MG tablet    ZANAFLEX    30 tablet    Take 1-2 tablets (4-8 mg) by mouth nightly as needed    Lumbar radiculopathy, Cervical radiculopathy

## 2018-09-26 NOTE — PATIENT INSTRUCTIONS
Please schedule physical with fasting labs (nothing to eat or drink except water and/or medications 10 hours prior to appointment) at your earliest convenience .       At [facility], we strive to deliver an exceptional experience to you, every time we see you.  If you receive a survey in the mail, please send us back your thoughts. We really do value your feedback.    Your care team:     Family Medicine   GURINDER Ayala MD Emily Bunt, JOE FOSTER   S. MD Alana Villalobos MD Angela Wermerskirchen, MD         Clinic hours: Monday - Wednesday 7 am-7 pm   Thursdays and Fridays 7 am-5 pm.     [facility]: Monday - Friday 11 am-9 pm,   Saturday and Sunday 9 am-5 pm.    [facility]: Monday -Thursday 8 am-8 pm; Friday 8 am-6 pm; Saturday and Sunday 9 am-5 pm.     [facility]: Monday - Thursday 8 am - 7 pm; Friday 8 am - 6 pm    Clinic: [phone]   [facility]: [phone]   [facility]: [phone]

## 2018-09-27 ENCOUNTER — TELEPHONE (OUTPATIENT)
Dept: FAMILY MEDICINE | Facility: CLINIC | Age: 63
End: 2018-09-27

## 2018-09-27 NOTE — TELEPHONE ENCOUNTER
Reason for Call:  Other     Detailed comments: Patient states the parking permit has the wrong date on it, it has February 2019 and it was suppose to be for April 2019 and asking if she should come after work with the form and it can be corrected? Patient is off at 3:30pm      Phone Number Patient can be reached at: Work number on file:  [phone] (work) only call her at work until 3:30pm takes patient 1 hour to drive home.    Best Time: any    Can we leave a detailed message on this number? YES    Call taken on 9/27/2018 at 8:23 AM by Melissa Arias

## 2019-04-30 ENCOUNTER — OFFICE VISIT (OUTPATIENT)
Dept: FAMILY MEDICINE | Facility: CLINIC | Age: 64
End: 2019-04-30
Payer: COMMERCIAL

## 2019-04-30 VITALS
HEART RATE: 84 BPM | RESPIRATION RATE: 18 BRPM | DIASTOLIC BLOOD PRESSURE: 82 MMHG | TEMPERATURE: 98.2 F | SYSTOLIC BLOOD PRESSURE: 137 MMHG | OXYGEN SATURATION: 97 %

## 2019-04-30 DIAGNOSIS — Z12.31 VISIT FOR SCREENING MAMMOGRAM: ICD-10-CM

## 2019-04-30 DIAGNOSIS — M54.16 LUMBAR RADICULOPATHY: Primary | ICD-10-CM

## 2019-04-30 DIAGNOSIS — Z13.1 SCREENING FOR DIABETES MELLITUS: ICD-10-CM

## 2019-04-30 DIAGNOSIS — Z13.220 SCREENING FOR HYPERLIPIDEMIA: ICD-10-CM

## 2019-04-30 DIAGNOSIS — Z12.11 SCREEN FOR COLON CANCER: ICD-10-CM

## 2019-04-30 DIAGNOSIS — E28.39 ESTROGEN DEFICIENCY: ICD-10-CM

## 2019-04-30 DIAGNOSIS — Z78.0 POSTMENOPAUSAL STATE: ICD-10-CM

## 2019-04-30 PROCEDURE — 99213 OFFICE O/P EST LOW 20 MIN: CPT | Performed by: PHYSICIAN ASSISTANT

## 2019-04-30 NOTE — PATIENT INSTRUCTIONS
Schedule physical and fasting labs (nothing to eat or drink for 9 hours except water and/or medications).    Schedule mammogram and dexa scan at [facility] ([phone]).    Schedule colonoscopy at [facility] ([phone]) for colon cancer screening.
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
## 2019-05-06 ENCOUNTER — OFFICE VISIT (OUTPATIENT)
Dept: ORTHOPEDICS | Facility: CLINIC | Age: 64
End: 2019-05-06
Payer: COMMERCIAL

## 2019-05-06 ENCOUNTER — TELEPHONE (OUTPATIENT)
Dept: FAMILY MEDICINE | Facility: CLINIC | Age: 64
End: 2019-05-06

## 2019-05-06 VITALS — OXYGEN SATURATION: 98 % | HEART RATE: 71 BPM

## 2019-05-06 DIAGNOSIS — M51.369 DEGENERATIVE DISC DISEASE, LUMBAR: ICD-10-CM

## 2019-05-06 DIAGNOSIS — M54.16 LUMBAR RADICULOPATHY: Primary | ICD-10-CM

## 2019-05-06 PROCEDURE — 99214 OFFICE O/P EST MOD 30 MIN: CPT | Performed by: PREVENTIVE MEDICINE

## 2019-05-06 RX ORDER — PREDNISONE 20 MG/1
40 TABLET ORAL DAILY
Qty: 10 TABLET | Refills: 0 | Status: SHIPPED | OUTPATIENT
Start: 2019-05-06 | End: 2019-11-07

## 2019-05-06 NOTE — PATIENT INSTRUCTIONS
Thanks for coming today.  [facility]  [address] [place] [address]    To schedule future appointments in Ortho Clinic, you may call [phone].    To schedule ordered imaging by your provider:   Call [facility] [phone]    To schedule an injection ordered by your provider:  Call [facility] Injection scheduling line: [phone]  Audioscribehart available online at:  [email or web address]    Please call if any further questions or concerns ([phone]).  Clinic hours 8 am to 5 pm.    Return to clinic (call) if symptoms worsen or fail to improve.

## 2019-05-06 NOTE — TELEPHONE ENCOUNTER
Forms placed on [facility] for completion.    Marina LANGLEY, Patient Care

## 2019-05-06 NOTE — TELEPHONE ENCOUNTER
What type of form? [employer]  What day did you drop off your forms? Monday, May 6  Is there a due date? ASAP (7-10 business days to compete forms)   How would you like to receive these forms? Patient will  at the clinic when completed    What is the best number to contact you?   [phone]     What time works best to contact you with in 4 hrs? ASAP  Is it okay to leave a message?  [phone]   Susana Smith (Auto signs name of person logged into Epic)

## 2019-05-06 NOTE — LETTER
2019         RE: Jose New  [address] [address]        Dear Colleague,    Thank you for referring your patient, Jose New, to the [facility]. Please see a copy of my visit note below.    HISTORY OF PRESENT ILLNESS  Ms. Alexandra New is a pleasant 64 year old year old female who presents to clinic today with ongoing lumbar radicular pain   She last had MRI in the  of , and has done some pool therapy since then, she continues to have low back pain moreso on the right side, that radiates into her right leg, she is not using any medications except for the occasional muscle relaxant  She has not followed through with my previous recommendations of pursuing a lumbar injection for her disc herniations, radicular pain  Jose explains that she is willing to pursue them now, because her pain is daily, and radiates from her back to her right leg  Location: low back and right leg  Quality:  achy pain    Severity: 6/10 at worst    Duration: 2+ years  Timing: occurs intermittently  Context: occurs while sitting and standing on/off all day  Modifying factors:  resting and non-use makes it better, movement and use makes it worse  Associated signs & symptoms: radiation into right leg  Previous similar pain: yes    Additional history: as documented    MEDICAL HISTORY  Patient Active Problem List   Diagnosis     Obesity (BMI 30.0-34.9)     Herniated disc, cervical     Lumbar radiculopathy     Cervical radiculopathy       Current Outpatient Medications   Medication Sig Dispense Refill     IBUPROFEN PO          No Known Allergies    Family History   Problem Relation Age of Onset     Cerebrovascular Disease Mother          age 93     Glaucoma Mother      Coronary Artery Disease Sister 73        MI - smoker     Diabetes No family hx of      Colon Cancer No family hx of      Breast Cancer No family hx of      Hypertension No family hx of         Additional medical/Social/Surgical histories reviewed in [facility] and updated as appropriate.     REVIEW OF SYSTEMS (5/6/2019)  10 point ROS of systems including Constitutional, Eyes, Respiratory, Cardiovascular, Gastroenterology, Genitourinary, Integumentary, Musculoskeletal, Psychiatric were all negative except for pertinent positives noted in my HPI.     PHYSICAL EXAM  Vitals:    05/06/19 0803   Pulse: 71   SpO2: 98%       Vital Signs: Pulse 71   SpO2 98%  Patient declined being weighed. There is no height or weight on file to calculate BMI.    General  - normal appearance, in no obvious distress, overweight  CV  - normal peripheral perfusion  Pulm  - normal respiratory pattern, non-labored  Musculoskeletal - lumbar spine  - stance: normal gait with slight right sided limp, no obvious leg length discrepancy, normal heel and toe walk  - inspection: normal bone and joint alignment, no obvious scoliosis  - palpation: no paravertebral or bony tenderness, except right low back paraspinal muscles  - ROM: flexion exacerbates pain, abnormal extension, sidebending, rotation are all limited by pain in low back  - strength: lower extremities 5/5 in all planes  - special tests:  (+) straight leg raise- right  (+) slump test  Neuro  - patellar and Achilles DTRs 2+ bilaterally, right lower extremity sensory deficit throughout L5 distribution, grossly normal coordination, normal muscle tone  Skin  - no ecchymosis, erythema, warmth, or induration, no obvious rash  Psych  - interactive, appropriate, normal mood and affect    ASSESSMENT & PLAN  63 yo female with lumbar disc herniation, radiculopathy, not resolved  Reviewed her previous lumbar MRI: Degenerative spine disease. Most significant findings are  at L4-5 where there is grade 1 anterolisthesis, disc bulge with right  posterolateral annular fissure at L4-5 degenerative posterior lateral  elements resulting in mild bilateral foraminal and spinal  canal  Stenosis.    Ordered an updated lumbar MRI with plan to order ALBERT  Given prednisone and refilled tizanadine  Cont. Pool exercise HEP  F/u by phone after MRI will consider ordering ALBERT with sedation if prefers      Diogenes Whaley MD, CAM    Again, thank you for allowing me to participate in the care of your patient.        Sincerely,        Diogenes Whaley MD

## 2019-05-06 NOTE — NURSING NOTE
Jose New's chief complaint for this visit includes:  Chief Complaint   Patient presents with     Right Knee - Pain, Follow Up     PCP: Tia Ledesma    Referring Provider:  Tia Ledesma PA-C  [address] [place] [address]    Pulse 71   SpO2 98%   Data Unavailable     Do you need any medication refills at today's visit? No      Stephany Khan LPN

## 2019-05-06 NOTE — PROGRESS NOTES
HISTORY OF PRESENT ILLNESS  Ms. Alexandra New is a pleasant 64 year old year old female who presents to clinic today with ongoing lumbar radicular pain   She last had MRI in the fall of , and has done some pool therapy since then, she continues to have low back pain moreso on the right side, that radiates into her right leg, she is not using any medications except for the occasional muscle relaxant  She has not followed through with my previous recommendations of pursuing a lumbar injection for her disc herniations, radicular pain  Jose explains that she is willing to pursue them now, because her pain is daily, and radiates from her back to her right leg  Location: low back and right leg  Quality:  achy pain    Severity: 6/10 at worst    Duration: 2+ years  Timing: occurs intermittently  Context: occurs while sitting and standing on/off all day  Modifying factors:  resting and non-use makes it better, movement and use makes it worse  Associated signs & symptoms: radiation into right leg  Previous similar pain: yes    Additional history: as documented    MEDICAL HISTORY  Patient Active Problem List   Diagnosis     Obesity (BMI 30.0-34.9)     Herniated disc, cervical     Lumbar radiculopathy     Cervical radiculopathy       Current Outpatient Medications   Medication Sig Dispense Refill     IBUPROFEN PO          No Known Allergies    Family History   Problem Relation Age of Onset     Cerebrovascular Disease Mother          age 93     Glaucoma Mother      Coronary Artery Disease Sister 73        MI - smoker     Diabetes No family hx of      Colon Cancer No family hx of      Breast Cancer No family hx of      Hypertension No family hx of        Additional medical/Social/Surgical histories reviewed in [facility] and updated as appropriate.     REVIEW OF SYSTEMS (2019)  10 point ROS of systems including Constitutional, Eyes, Respiratory, Cardiovascular, Gastroenterology, Genitourinary, Integumentary,  Musculoskeletal, Psychiatric were all negative except for pertinent positives noted in my HPI.     PHYSICAL EXAM  Vitals:    05/06/19 0803   Pulse: 71   SpO2: 98%       Vital Signs: Pulse 71   SpO2 98%  Patient declined being weighed. There is no height or weight on file to calculate BMI.    General  - normal appearance, in no obvious distress, overweight  CV  - normal peripheral perfusion  Pulm  - normal respiratory pattern, non-labored  Musculoskeletal - lumbar spine  - stance: normal gait with slight right sided limp, no obvious leg length discrepancy, normal heel and toe walk  - inspection: normal bone and joint alignment, no obvious scoliosis  - palpation: no paravertebral or bony tenderness, except right low back paraspinal muscles  - ROM: flexion exacerbates pain, abnormal extension, sidebending, rotation are all limited by pain in low back  - strength: lower extremities 5/5 in all planes  - special tests:  (+) straight leg raise- right  (+) slump test  Neuro  - patellar and Achilles DTRs 2+ bilaterally, right lower extremity sensory deficit throughout L5 distribution, grossly normal coordination, normal muscle tone  Skin  - no ecchymosis, erythema, warmth, or induration, no obvious rash  Psych  - interactive, appropriate, normal mood and affect    ASSESSMENT & PLAN  65 yo female with lumbar disc herniation, radiculopathy, not resolved  Reviewed her previous lumbar MRI: Degenerative spine disease. Most significant findings are  at L4-5 where there is grade 1 anterolisthesis, disc bulge with right  posterolateral annular fissure at L4-5 degenerative posterior lateral  elements resulting in mild bilateral foraminal and spinal canal  Stenosis.    Ordered an updated lumbar MRI with plan to order ALBERT  Given prednisone and refilled tizanadine  Cont. Pool exercise HEP  F/u by phone after MRI will consider ordering ALBERT with sedation if prefers      Diogenes Whaley MD, CAQSM

## 2019-05-20 ENCOUNTER — ANCILLARY PROCEDURE (OUTPATIENT)
Dept: MRI IMAGING | Facility: CLINIC | Age: 64
End: 2019-05-20
Attending: PREVENTIVE MEDICINE
Payer: COMMERCIAL

## 2019-05-20 DIAGNOSIS — M54.16 LUMBAR RADICULOPATHY: ICD-10-CM

## 2019-05-20 DIAGNOSIS — M51.369 DEGENERATIVE DISC DISEASE, LUMBAR: ICD-10-CM

## 2019-05-20 PROCEDURE — 72148 MRI LUMBAR SPINE W/O DYE: CPT | Performed by: RADIOLOGY

## 2019-05-23 DIAGNOSIS — M54.16 LUMBAR RADICULOPATHY: Primary | ICD-10-CM

## 2019-05-23 DIAGNOSIS — M51.16 LUMBAR DISC HERNIATION WITH RADICULOPATHY: ICD-10-CM

## 2019-06-24 ENCOUNTER — ANCILLARY PROCEDURE (OUTPATIENT)
Dept: GENERAL RADIOLOGY | Facility: CLINIC | Age: 64
End: 2019-06-24
Attending: PREVENTIVE MEDICINE
Payer: COMMERCIAL

## 2019-06-24 VITALS — DIASTOLIC BLOOD PRESSURE: 86 MMHG | SYSTOLIC BLOOD PRESSURE: 139 MMHG

## 2019-06-24 DIAGNOSIS — M54.16 LUMBAR RADICULOPATHY: ICD-10-CM

## 2019-06-24 DIAGNOSIS — M51.16 LUMBAR DISC HERNIATION WITH RADICULOPATHY: ICD-10-CM

## 2019-06-24 PROCEDURE — 62323 NJX INTERLAMINAR LMBR/SAC: CPT | Performed by: RADIOLOGY

## 2019-06-24 RX ORDER — METHYLPREDNISOLONE ACETATE 80 MG/ML
80 INJECTION, SUSPENSION INTRA-ARTICULAR; INTRALESIONAL; INTRAMUSCULAR; SOFT TISSUE ONCE
Status: COMPLETED | OUTPATIENT
Start: 2019-06-24 | End: 2019-06-24

## 2019-06-24 RX ORDER — BUPIVACAINE HYDROCHLORIDE 5 MG/ML
5 INJECTION, SOLUTION EPIDURAL; INTRACAUDAL ONCE
Status: COMPLETED | OUTPATIENT
Start: 2019-06-24 | End: 2019-06-24

## 2019-06-24 RX ORDER — LIDOCAINE HYDROCHLORIDE 10 MG/ML
5 INJECTION, SOLUTION INFILTRATION; PERINEURAL ONCE
Status: COMPLETED | OUTPATIENT
Start: 2019-06-24 | End: 2019-06-24

## 2019-06-24 RX ORDER — IOPAMIDOL 408 MG/ML
20 INJECTION, SOLUTION INTRATHECAL ONCE
Status: COMPLETED | OUTPATIENT
Start: 2019-06-24 | End: 2019-06-24

## 2019-06-24 RX ADMIN — METHYLPREDNISOLONE ACETATE 80 MG: 80 INJECTION, SUSPENSION INTRA-ARTICULAR; INTRALESIONAL; INTRAMUSCULAR; SOFT TISSUE at 13:56

## 2019-06-24 RX ADMIN — LIDOCAINE HYDROCHLORIDE 5 ML: 10 INJECTION, SOLUTION INFILTRATION; PERINEURAL at 13:57

## 2019-06-24 RX ADMIN — IOPAMIDOL 4 ML: 408 INJECTION, SOLUTION INTRATHECAL at 13:57

## 2019-06-24 RX ADMIN — BUPIVACAINE HYDROCHLORIDE 10 MG: 5 INJECTION, SOLUTION EPIDURAL; INTRACAUDAL at 13:56

## 2019-06-24 NOTE — PLAN OF CARE
Post procedure Instructions for Joint Aspirations:  o You may have some numbness and/or tingling in the area. This will go away within a couple hours.  o You may feel some tightness in the joint. This should only last a few hours.  o Try to rest for the next 12 hours. You can go back to normal activities the day after your procedure.  o Notify your primary doctor if you have any questions or concerns.   o If you have urgent concerns after hours regarding your procedure you can call [phone] and ask for the Radiologist on call.

## 2019-06-24 NOTE — PLAN OF CARE
Jose was seen in X-ray today for a steroid injection. Patient rated pain before procedure 8/10 in her rt knee. After procedure patient rated her pain as the same in her knee.  Patient discharged home with her .

## 2019-07-10 ENCOUNTER — TELEPHONE (OUTPATIENT)
Dept: FAMILY MEDICINE | Facility: CLINIC | Age: 64
End: 2019-07-10

## 2019-07-10 NOTE — TELEPHONE ENCOUNTER
Panel Management Review   One phone call and send letter if unable to reach them or [employer] message and send letter if not read after 2 weeks (You will get a message to your inbasket)      BP Readings from Last 1 Encounters:   06/24/19 139/86        Health Maintenance Due   Topic Date Due     DEXA  1955     HEPATITIS C SCREENING  1955     ADVANCE CARE PLANNING  1955     MAMMO SCREENING  1955     PAP  1955     COLONOSCOPY  01/18/1964     HIV SCREENING  01/18/1969     DTAP/TDAP/TD IMMUNIZATION (1 - Tdap) 01/18/1979     ZOSTER IMMUNIZATION (1 of 2) 01/18/2004     MEDICARE ANNUAL WELLNESS VISIT  01/18/2019     PNEUMOCOCCAL IMMUNIZATION 65+ LOW/MEDIUM RISK (1 of 2 - PCV13) 01/18/2019        Fail List measure: BMI        Patient is due/failing the following:   BMI    Action needed:   Patient needs office visit for Preventative Care Visit.    Type of outreach:    Phone, spoke to patient.  Not right now, but will call back    Questions for provider review:    None                                                                                                                        Carlie Jiménez

## 2019-09-23 ENCOUNTER — TELEPHONE (OUTPATIENT)
Dept: ORTHOPEDICS | Facility: CLINIC | Age: 64
End: 2019-09-23

## 2019-09-23 NOTE — TELEPHONE ENCOUNTER
Letter was given to Dr. Whaley last week, will check status of form when he returns to clinic tomorrow.

## 2019-09-23 NOTE — TELEPHONE ENCOUNTER
Health Call Center    Phone Message    May a detailed message be left on voicemail: yes    Reason for Call: Other: Jose calling to follow up on the forms that her  dropped off to Dr. Whaley's office.  She is requesting that the forms be signed and then sent back to either her  or mailed to her home address.  Please call her back with any updates     Action Taken: Message routed to:  Adult Clinics: Sports Medicine p [phone]

## 2019-09-25 NOTE — TELEPHONE ENCOUNTER
[facility] Call Center    Phone Message    May a detailed message be left on voicemail: yes    Reason for Call: Other: Patient calling for an update on her forms. Wanting to know if they are completed. Please advise.     Action Taken: Message routed to:  Adult Clinics: Sports Medicine p [phone]

## 2019-09-27 NOTE — TELEPHONE ENCOUNTER
9.27.19    Work number [phone]. Call this number first.     Pt needs the forms filled.  Pt needs these filled out for her car insurance and bills she is getting.

## 2019-09-30 NOTE — TELEPHONE ENCOUNTER
Called and let her know that per Dr. Whaley he is working on completing, will have complete by Friday.     She is OK with this.

## 2019-10-16 ENCOUNTER — OFFICE VISIT (OUTPATIENT)
Dept: ORTHOPEDICS | Facility: CLINIC | Age: 64
End: 2019-10-16
Payer: COMMERCIAL

## 2019-10-16 DIAGNOSIS — M51.369 DEGENERATIVE DISC DISEASE, LUMBAR: ICD-10-CM

## 2019-10-16 DIAGNOSIS — M54.16 LUMBAR RADICULOPATHY: Primary | ICD-10-CM

## 2019-10-16 PROCEDURE — 99214 OFFICE O/P EST MOD 30 MIN: CPT | Performed by: PREVENTIVE MEDICINE

## 2019-10-16 RX ORDER — PREDNISONE 20 MG/1
40 TABLET ORAL DAILY
Qty: 12 TABLET | Refills: 1 | Status: SHIPPED | OUTPATIENT
Start: 2019-10-16 | End: 2019-11-07
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
## 2019-10-16 NOTE — PROGRESS NOTES
[facility] FOLLOW-UP VISIT 10/16/2019    Jose New's chief complaint for this visit includes:  Chief Complaint   Patient presents with     RECHECK     f/u lumbar ALBERT, and left knee pain, was doing great after the ALBERT, started exercises in that increased her pain.      PCP: Tia Ledesma    Referring Provider:  No referring provider defined for this encounter.    There were no vitals taken for this visit.  Data Unavailable       Interval History:     Follow up reason: lumbar spine and lef knee     Medical History:    Any recent changes to your medical history? No    Any new medication prescribed since last visit? No    Review of Systems:    Do you have fever, chills, weight loss? No    Do you have any vision problems? No    Do you have any chest pain or edema? No    Do you have any shortness of breath or wheezing?  No    Do you have stomach problems? No    Do you have any numbness or focal weakness? No    Do you have diabetes? No    Do you have problems with bleeding or clotting? No    Do you have an rashes or other skin lesions? No
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
## 2019-11-06 ENCOUNTER — OFFICE VISIT (OUTPATIENT)
Dept: ORTHOPEDICS | Facility: CLINIC | Age: 64
End: 2019-11-06
Payer: COMMERCIAL

## 2019-11-06 VITALS — HEIGHT: 67 IN | BODY MASS INDEX: 29.98 KG/M2 | WEIGHT: 191 LBS

## 2019-11-06 DIAGNOSIS — M54.16 LUMBAR RADICULOPATHY: Primary | ICD-10-CM

## 2019-11-06 DIAGNOSIS — M51.369 DEGENERATIVE DISC DISEASE, LUMBAR: ICD-10-CM

## 2019-11-06 PROCEDURE — 99213 OFFICE O/P EST LOW 20 MIN: CPT | Performed by: PREVENTIVE MEDICINE

## 2019-11-06 ASSESSMENT — PAIN SCALES - GENERAL: PAINLEVEL: MILD PAIN (2)

## 2019-11-06 ASSESSMENT — MIFFLIN-ST. JEOR: SCORE: 1444
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
## 2019-11-06 NOTE — PATIENT INSTRUCTIONS
Thanks for coming today.  Ortho/Sports Medicine Clinic  [address] [place] [address]    To schedule future appointments in Ortho Clinic, you may call [phone].    To schedule ordered imaging by your provider:   Call Central Imaging [phone]    To schedule an injection ordered by your provider:  Call Central Imaging Injection scheduling line: [phone]  [email or web address] available online at:  [email or web address]    Please call if any further questions or concerns ([phone]).  Clinic hours 8 am to 5 pm.    Return to clinic (call) if symptoms worsen or fail to improve.

## 2019-11-06 NOTE — PROGRESS NOTES
"Jose New's chief complaint for this visit includes:  Chief Complaint   Patient presents with     Follow Up     Follow up after lumbar ALBERT      PCP: Tia Ledesma    Referring Provider:  No referring provider defined for this encounter.    Ht 1.702 m (5' 7\")   Wt 86.6 kg (191 lb)   BMI 29.91 kg/m    Mild Pain (2)       "
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
## 2020-02-12 ENCOUNTER — OFFICE VISIT (OUTPATIENT)
Dept: ORTHOPEDICS | Facility: CLINIC | Age: 65
End: 2020-02-12
Payer: COMMERCIAL

## 2020-02-12 VITALS
HEART RATE: 76 BPM | WEIGHT: 191 LBS | DIASTOLIC BLOOD PRESSURE: 87 MMHG | SYSTOLIC BLOOD PRESSURE: 144 MMHG | HEIGHT: 67 IN | BODY MASS INDEX: 29.98 KG/M2

## 2020-02-12 DIAGNOSIS — M54.16 LUMBAR RADICULOPATHY: Primary | ICD-10-CM

## 2020-02-12 DIAGNOSIS — M51.16 LUMBAR DISC HERNIATION WITH RADICULOPATHY: ICD-10-CM

## 2020-02-12 PROCEDURE — 99213 OFFICE O/P EST LOW 20 MIN: CPT | Performed by: PREVENTIVE MEDICINE

## 2020-02-12 ASSESSMENT — PAIN SCALES - GENERAL: PAINLEVEL: NO PAIN (0)

## 2020-02-12 ASSESSMENT — MIFFLIN-ST. JEOR: SCORE: 1439
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
## 2020-02-12 NOTE — PATIENT INSTRUCTIONS
Thanks for coming today.  Ortho/Sports Medicine Clinic  [address] [place] [address]    To schedule future appointments in Ortho Clinic, you may call [phone].    To schedule ordered imaging by your provider:   Call Central Imaging [phone]    To schedule an injection ordered by your provider:  Call Central Imaging Injection scheduling line: [phone]  [email or web address] available online at:  [email or web address]    Please call if any further questions or concerns ([phone]).  Clinic hours 8 am to 5 pm.    Return to clinic (call) if symptoms worsen or fail to improve.

## 2020-12-18 ENCOUNTER — NURSE TRIAGE (OUTPATIENT)
Dept: NURSING | Facility: CLINIC | Age: 65
End: 2020-12-18

## 2020-12-19 NOTE — TELEPHONE ENCOUNTER
"\"I have had a sore throat for about a week. I also have a lot of mucous in my throat. It feels like a glob in the back of my throat.  The right gland feels swollen and tender. I do not see any pus on my tonsils and not fever.  I have tried Tylenol, Salt water gargles and hot tea with honey. Nothing is helping.\"  Denies other sx   Triaged and gave home advice and offered UC or to be seen within 1-2 days  Call back if needed  Darby Marsh RN [facility]    COVID 19 Nurse Triage Plan/Patient Instructions    Please be aware that novel coronavirus (COVID-19) may be circulating in the community. If you develop symptoms such as fever, cough, or SOB or if you have concerns about the presence of another infection including coronavirus (COVID-19), please contact your health care provider or visit [email or web address].     Disposition/Instructions    Additional COVID19 information to add for patients.   How can I protect others?  If you have symptoms (fever, cough, body aches or trouble breathing): Stay home and away from others (self-isolate) until:    At least 10 days have passed since your symptoms started, And     You ve had no fever--and no medicine that reduces fever--for 1 full day (24 hours), And      Your other symptoms have resolved (gotten better).     If you don t have symptoms, but a test showed that you have COVID-19 (you tested positive):    Stay home and away from others (self-isolate). Follow the tips under \"How do I self-isolate?\" below for 10 days (20 days if you have a weak immune system).    You don't need to be retested for COVID-19 before going back to school or work. As long as you're fever-free and feeling better, you can go back to school, work and other activities after waiting the 10 or 20 days.     How do I self-isolate?    Stay in your own room, even for meals. Use your own bathroom if you can.     Stay away from others in your home. No hugging, kissing or shaking hands. No " visitors.    Don t go to work, school or anywhere else.     Clean  high touch  surfaces often (doorknobs, counters, handles, etc.). Use a household cleaning spray or wipes. You ll find a full list on the EPA website:  www.epa.gov/pesticide-registration/list-n-disinfectants-use-against-sars-cov-2.    Cover your mouth and nose with a mask, tissue or washcloth to avoid spreading germs.    Wash your hands and face often. Use soap and water.    Caregivers in these groups are at risk for severe illness due to COVID-19:  o People 65 years and older  o People who live in a nursing home or long-term care facility  o People with chronic disease (lung, heart, cancer, diabetes, kidney, liver, immunologic)  o People who have a weakened immune system, including those who:  - Are in cancer treatment  - Take medicine that weakens the immune system, such as corticosteroids  - Had a bone marrow or organ transplant  - Have an immune deficiency  - Have poorly controlled HIV or AIDS  - Are obese (body mass index of 40 or higher)  - Smoke regularly    Caregivers should wear gloves while washing dishes, handling laundry and cleaning bedrooms and bathrooms.    Use caution when washing and drying laundry: Don t shake dirty laundry, and use the warmest water setting that you can.    For more tips, go to www.cdc.gov/coronavirus/2019-ncov/downloads/10Things.pdf.    How can I take care of myself?  1. Get lots of rest. Drink extra fluids (unless a doctor has told you not to).     2. Take Tylenol (acetaminophen) for fever or pain. If you have liver or kidney problems, ask your family doctor if it s okay to take Tylenol.     Adults can take either:     650 mg (two 325 mg pills) every 4 to 6 hours, or     1,000 mg (two 500 mg pills) every 8 hours as needed.     Note: Don t take more than 3,000 mg in one day.   Acetaminophen is found in many medicines (both prescribed and over-the-counter medicines). Read all labels to be sure you don t take too  much.     For children, check the Tylenol bottle for the right dose. The dose is based on the child s age or weight.    3. If you have other health problems (like cancer, heart failure, an organ transplant or severe kidney disease): Call your specialty clinic if you don t feel better in the next 2 days.    4. Know when to call 911: Emergency warning signs include:    Trouble breathing or shortness of breath    Pain or pressure in the chest that doesn t go away    Feeling confused like you haven t felt before, or not being able to wake up    Bluish-colored lips or face    What are the symptoms of COVID-19?     The most common symptoms are cough, fever and trouble breathing.     Less common symptoms include body aches, chills, diarrhea (loose, watery poops), fatigue (feeling very tired), headache, runny nose, sore throat and loss of smell.    COVID-19 can cause severe coughing (bronchitis) and lung infection (pneumonia).    How does it spread?     The virus may spread when a person coughs or sneezes into the air. The virus can travel about 6 feet this way, and it can live on surfaces.      Common  (household disinfectants) will kill the virus.    Who is at risk?  Anyone can catch COVID-19 if they re around someone who has the virus.    How can others protect themselves?     Stay away from people who have COVID-19 (or symptoms of COVID-19).    Wash hands often with soap and water. Or, use hand  with at least 60% alcohol.    Avoid touching the eyes, nose or mouth.     Wear a face mask when you go out in public, when sick or when caring for a sick person.    Where can I get more information?     [facility]: About COVID-19: [email or web address]    CDC: What to Do If You re Sick: www.cdc.gov/coronavirus/2019-ncov/about/steps-when-sick.html    CDC: Ending Home Isolation: www.cdc.gov/coronavirus/2019-ncov/hcp/disposition-in-home-patients.html     CDC: Caring for Someone:  www.cdc.gov/coronavirus/2019-ncov/if-you-are-sick/care-for-someone.html     [facility]: Interim Guidance for Hospital Discharge to Home: www.[facility]/diseases/coronavirus/hcp/hospdischarge.pdf    [facility] clinical trials (COVID-19 research studies): clinicalaffairs.[facility]-clinical-trials     Below are the COVID-19 hotlines at the [place] Department of Health ([facility]). Interpreters are available.   o For health questions: Call 672-314-9875 or 1-780.459.4668 (7 a.m. to 7 p.m.)  o For questions about schools and childcare: Call 288-550-3570 or 1-752.825.3266 (7 a.m. to 7 p.m.)          Thank you for taking steps to prevent the spread of this virus.  o Limit your contact with others.  o Wear a simple mask to cover your cough.  o Wash your hands well and often.    Resources    [facility]: About COVID-19: [email or web address]    CDC: What to Do If You're Sick: www.cdc.gov/coronavirus/2019-ncov/about/steps-when-sick.html    CDC: Ending Home Isolation: www.cdc.gov/coronavirus/2019-ncov/hcp/disposition-in-home-patients.html     CDC: Caring for Someone: www.cdc.gov/coronavirus/2019-ncov/if-you-are-sick/care-for-someone.html     [facility]: Interim Guidance for Hospital Discharge to Home: www.[facility]./diseases/coronavirus/hcp/hospdischarge.pdf    [facility] clinical trials (COVID-19 research studies): clinicalaffairs.[facility]-clinical-trials     Below are the COVID-19 hotlines at the [place] Department of [facility] ([facility]). Interpreters are available.   o For health questions: Call 554-515-3825 or 1-545.694.2422 (7 a.m. to 7 p.m.)  o For questions about schools and childcare: Call 616-301-0918 or 1-761.723.1548 (7 a.m. to 7 p.m.)                     Additional Information    Negative: [1] Difficulty breathing AND [2] not severe    Negative: Fever > 104 F (40 C)    Negative: [1] Refuses to drink anything AND [2] for > 12 hours    Negative: [1] Drinking very little AND [2]  "dehydration suspected (e.g., no urine > 12 hours, very dry mouth, very lightheaded)    Negative: Patient sounds very sick or weak to the triager    Negative: SEVERE (e.g., excruciating) throat pain    Negative: [1] Pus on tonsils (back of throat) AND [2]  fever AND [3] swollen neck lymph nodes (\"glands\")    Negative: [1] Rash AND [2] widespread (especially chest and abdomen)    Negative: Earache also present    Negative: Fever present > 3 days (72 hours)    Negative: Diabetes mellitus or weak immune system (e.g., HIV positive, cancer chemo, splenectomy, organ transplant, chronic steroids)    Negative: History of rheumatic fever    Negative: [1] Adult is leaving on a trip AND [2] requests an antibiotic NOW    Negative: [1] Positive throat culture or rapid strep test (according to lab, PCP, caller, etc.) AND [2] NO  standing order to call in prescription for antibiotic    Negative: [1] Exposure to family member (or spouse or boyfriend/girlfriend) with test-proven strep AND [2] within last 10 days    [1] Sore throat is the only symptom AND [2] present > 48 hours    Protocols used: SORE THROAT-A-AH      "

## 2022-11-05 NOTE — PROGRESS NOTES
Received request from Justa Garcia and Leoncio for narrative report. [place] [facility] [place].    Refused am hydralazine, reeducation provided, continues to refuse. Scant blood noticed to pt's right AKA incision, tender to touch. No more bleeding noticed after initial blood was wiped and green pad place underneath extremity/.

## 2022-11-22 ENCOUNTER — TELEPHONE (OUTPATIENT)
Dept: FAMILY MEDICINE | Facility: CLINIC | Age: 67
End: 2022-11-22

## 2022-11-22 NOTE — TELEPHONE ENCOUNTER
Reason for Call:  Appointment Request    Patient requesting this type of appt:  AWV    Requested provider: [facility]    Reason patient unable to be scheduled: tried to schedule pt for AWV but pt dec;ined at this time and will call back later to set this us    When does patient want to be seen/preferred time: n/a    Comments: Pt will call back to schedule appt    Okay to leave a detailed message?: No pt will call us back    Call taken on 11/22/2022 at 5:00 PM by Ely Shelley CMA

## 2024-05-03 NOTE — MR AVS SNAPSHOT
After Visit Summary   7/19/2017    Jose New    MRN: [id number]           Patient Information     Date Of Birth          1955        Visit Information        Provider Department      7/19/2017 3:20 PM Holly Dowling, PT [facility]        Today's Diagnoses     Acute pain of both knees        Acute bilateral low back pain without sciatica        Neck pain           Follow-ups after your visit        Your next 10 appointments already scheduled     Jul 28, 2017  2:40 PM CDT   GAEL Spine with Holly Dowling PT   [facility] ([facility])    [address]  [facility] [id number] [phone]            Aug 11, 2017  2:40 PM CDT   GAEL Spine with Holly Dowling, PT   [facility] ([facility])    [address]  [facility] [id number] [phone]              Who to contact     If you have questions or need follow up information about today's clinic visit or your schedule please contact [facility] directly at [phone].  Normal or non-critical lab and imaging results will be communicated to you by MyChart, letter or phone within 4 business days after the clinic has received the results. If you do not hear from us within 7 days, please contact the clinic through [facility] or phone. If you have a critical or abnormal lab result, we will notify you by phone as soon as possible.  Submit refill requests through [facility] or call your pharmacy and they will forward the refill request to us. Please allow 3 business days for your refill to be completed.          Additional Information About Your Visit        [facility] Information     [facility] lets you send messages to your doctor, view your test results, renew your prescriptions, schedule appointments and more. To sign  done "up, go to [email or web address] . Click on \"Log in\" on the left side of the screen, which will take you to the Welcome page. Then click on \"Sign up Now\" on the right side of the page.     You will be asked to enter the access code listed below, as well as some personal information. Please follow the directions to create your username and password.     Your access code is: [email or web address]  Expires: 10/8/2017  7:44 PM     Your access code will  in 90 days. If you need help or a new code, please call your [facility] clinic or [phone].        Care EveryWhere ID     This is your Care EveryWhere ID. This could be used by other organizations to access your [facility] medical records  [id number]         Blood Pressure from Last 3 Encounters:   17 136/86   17 160/80   17 142/80    Weight from Last 3 Encounters:   17 86.8 kg (191 lb 4.8 oz)   16 87 kg (191 lb 14.4 oz)   16 87.6 kg (193 lb 1 oz)              We Performed the Following     Manual Ther Tech, 1+Regions, EA 15 min     Therapeutic Activities     Therapeutic Exercises        Primary Care Provider Office Phone # Fax #    Tia Ledesma PA-C [phone]       [facility] [address] [place] [id number]        Equal Access to Services     OMAR BARCENAS AH: Hadii madhu ku hadasho Soomaali, waaxda luqadaha, qaybta kaalmada adeegyada, paul angela. So [facility] [phone].    ATENCIÓN: Si sofiala shnat, tiene a pendleton disposición servicios gratuitos de asistencia lingüística. Albertina al [phone].    We comply with applicable federal civil rights laws and [place] laws. We do not discriminate on the basis of race, color, national origin, age, disability sex, sexual orientation or gender identity.            Thank you!     Thank you for choosing [facility]  for your care. Our goal is always to provide you with " excellent care. Hearing back from our patients is one way we can continue to improve our services. Please take a few minutes to complete the written survey that you may receive in the mail after your visit with us. Thank you!             Your Updated Medication List - Protect others around you: Learn how to safely use, store and throw away your medicines at www.disposemymeds.org.          This list is accurate as of: 7/19/17  4:43 PM.  Always use your most recent med list.                   Brand Name Dispense Instructions for use Diagnosis    methocarbamol 500 MG tablet    ROBAXIN    120 tablet    Take 2 tablets (1,000 mg) by mouth 3 times daily as needed for muscle spasms    Cervical strain, subsequent encounter, Left shoulder strain, subsequent encounter, Strain of trapezius muscle, right, subsequent encounter
